# Patient Record
Sex: FEMALE | Race: WHITE | NOT HISPANIC OR LATINO
[De-identification: names, ages, dates, MRNs, and addresses within clinical notes are randomized per-mention and may not be internally consistent; named-entity substitution may affect disease eponyms.]

---

## 2020-04-26 ENCOUNTER — MESSAGE (OUTPATIENT)
Age: 25
End: 2020-04-26

## 2020-05-05 LAB
SARS-COV-2 IGG SERPL IA-ACNC: <0.1 INDEX
SARS-COV-2 IGG SERPL QL IA: NEGATIVE

## 2020-05-06 ENCOUNTER — APPOINTMENT (OUTPATIENT)
Dept: DISASTER EMERGENCY | Facility: HOSPITAL | Age: 25
End: 2020-05-06

## 2021-06-18 ENCOUNTER — EMERGENCY (EMERGENCY)
Facility: HOSPITAL | Age: 26
LOS: 1 days | Discharge: HOME | End: 2021-06-18
Attending: EMERGENCY MEDICINE
Payer: COMMERCIAL

## 2021-06-18 VITALS
WEIGHT: 132.28 LBS | SYSTOLIC BLOOD PRESSURE: 141 MMHG | TEMPERATURE: 99 F | HEART RATE: 119 BPM | HEIGHT: 60 IN | OXYGEN SATURATION: 98 % | RESPIRATION RATE: 18 BRPM | DIASTOLIC BLOOD PRESSURE: 95 MMHG

## 2021-06-18 PROCEDURE — 99284 EMERGENCY DEPT VISIT MOD MDM: CPT

## 2021-06-18 PROCEDURE — 99283 EMERGENCY DEPT VISIT LOW MDM: CPT

## 2021-06-18 RX ORDER — CYCLOBENZAPRINE HYDROCHLORIDE 10 MG/1
5 TABLET, FILM COATED ORAL ONCE
Refills: 0 | Status: COMPLETED | OUTPATIENT
Start: 2021-06-18 | End: 2021-06-18

## 2021-06-18 RX ORDER — METHOCARBAMOL 500 MG/1
1500 TABLET, FILM COATED ORAL ONCE
Refills: 0 | Status: DISCONTINUED | OUTPATIENT
Start: 2021-06-18 | End: 2021-06-18

## 2021-06-18 RX ORDER — CYCLOBENZAPRINE HYDROCHLORIDE 10 MG/1
1 TABLET, FILM COATED ORAL
Qty: 3 | Refills: 0
Start: 2021-06-18 | End: 2021-06-18

## 2021-06-18 RX ADMIN — CYCLOBENZAPRINE HYDROCHLORIDE 5 MILLIGRAM(S): 10 TABLET, FILM COATED ORAL at 22:15

## 2021-06-18 NOTE — ED PROVIDER NOTE - PHYSICAL EXAMINATION
Gen: No acute distress, non toxic  HEENT: Mucous membranes moist, pink conjunctivae, EOMI  CV: Well perfused  Resp: No resp distress  Neuro: A&O x 3, moving all 4 extremities  MSK: No spine or joint tenderness to palpation. mild right paralumbar ttp. FWB and ambulatory with steady gait  Skin: No rashes. intact and perfused.

## 2021-06-18 NOTE — ED PROVIDER NOTE - NSFOLLOWUPINSTRUCTIONS_ED_ALL_ED_FT
- Follow up with your doctor within 2-3 days.   - Return to the ED for any new or worsening symptoms included but not limited to weakness, urinary symptoms, numbness, difficulty walking, bowel/bladder incontinence, fevers, etc  - May take ibuprofen 600mg every 6 hours as needed for pain  - May take flexeril 5mg as needed for pain  - Avoid heavy lifting, significant exertion  - Apply heating pads/warm compresses to affected area while resting     Back Pain    Back pain is very common in adults. The cause of back pain is rarely dangerous and the pain often gets better over time. The cause of your back pain may not be known and may include strain of muscles or ligaments, degeneration of the spinal disks, or arthritis. Occasionally the pain may radiate down your leg(s). Over-the-counter medicines to reduce pain and inflammation are often the most helpful. Stretching and remaining active frequently helps the healing process.     SEEK IMMEDIATE MEDICAL CARE IF YOU HAVE ANY OF THE FOLLOWING SYMPTOMS: bowel or bladder control problems, unusual weakness or numbness in your arms or legs, nausea or vomiting, abdominal pain, fever, dizziness/lightheadedness.

## 2021-06-18 NOTE — ED PROVIDER NOTE - NS ED ROS FT
Gen: denies fever, chills, fatigue, weight loss  Skin: denies rashes, laceration, bruising  HEENT: denies visual changes, ear pain, nasal congestion, throat pain  Respiratory: denies TYLER, SOB, cough, wheezing  Cardiovascular: denies chest pain, palpitations, diaphoresis, LE edema  GI: denies abdominal pain, n/v/d  : denies dysuria, frequency, urgency, bowel/bladder incontinence  MSK: +BACK PAIN. denies joint swelling/pain, neck pain  Neuro: denies headache, dizziness, weakness, numbness  Psych: denies anxiety, depression, SI/HI, visual/auditory hallucinations

## 2021-06-18 NOTE — ED PROVIDER NOTE - CLINICAL SUMMARY MEDICAL DECISION MAKING FREE TEXT BOX
25yo F presenting with mild low back pain after twisting. Pt well appearing, ambulatory without difficulty. No red flags. Neuro intact. Given rx for flexeril. Stable for dc
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
## 2021-06-18 NOTE — ED PROVIDER NOTE - PATIENT PORTAL LINK FT
You can access the FollowMyHealth Patient Portal offered by [facility] by registering at the following website: [email or web address]. By joining [employer]’s FollowMyHealth portal, you will also be able to view your health information using other applications (apps) compatible with our system.

## 2022-12-13 NOTE — ED PROVIDER NOTE - ATTENDING CONTRIBUTION TO CARE
Patient with lower back pain.  Evaluated by PA.  VSS.  Non toxic.  Well appearing. NV intact.  will treat.  will f/u.  Uneventful ED observation period. 13-Dec-2022 00:32

## 2023-12-07 ENCOUNTER — EMERGENCY (EMERGENCY)
Facility: HOSPITAL | Age: 28
LOS: 1 days | Discharge: HOME | End: 2023-12-07
Attending: EMERGENCY MEDICINE
Payer: COMMERCIAL

## 2023-12-07 VITALS
OXYGEN SATURATION: 97 % | TEMPERATURE: 98 F | SYSTOLIC BLOOD PRESSURE: 149 MMHG | WEIGHT: 130.07 LBS | HEIGHT: 60 IN | RESPIRATION RATE: 20 BRPM | HEART RATE: 124 BPM | DIASTOLIC BLOOD PRESSURE: 86 MMHG

## 2023-12-07 VITALS — DIASTOLIC BLOOD PRESSURE: 82 MMHG | HEART RATE: 110 BPM | SYSTOLIC BLOOD PRESSURE: 133 MMHG

## 2023-12-07 PROCEDURE — 99284 EMERGENCY DEPT VISIT MOD MDM: CPT

## 2023-12-07 PROCEDURE — 73590 X-RAY EXAM OF LOWER LEG: CPT | Mod: 26,RT

## 2023-12-07 PROCEDURE — 73630 X-RAY EXAM OF FOOT: CPT | Mod: 26,RT

## 2023-12-07 PROCEDURE — 73590 X-RAY EXAM OF LOWER LEG: CPT

## 2023-12-07 PROCEDURE — 73630 X-RAY EXAM OF FOOT: CPT

## 2023-12-07 PROCEDURE — 73610 X-RAY EXAM OF ANKLE: CPT

## 2023-12-07 PROCEDURE — 73610 X-RAY EXAM OF ANKLE: CPT | Mod: 26,RT

## 2023-12-07 PROCEDURE — 99053 MED SERV 10PM-8AM 24 HR FAC: CPT

## 2023-12-07 NOTE — ED PROVIDER NOTE - PHYSICAL EXAMINATION
Gen: No acute distress, non toxic  HEENT: Mucous membranes moist, pink conjunctivae, EOMI  CV: RRR, nl s1/s2.  Resp: CTAB, normal rate and effort  Neuro: A&O x4, MAEx4. 5/5 str ext x 4. Sensation intact, symmetric throughout. No fnd's  MSK: +ttp to the rt lateral malleolus. no gross deformity of extremitY. FROM LE b/l. compartments soft/compressible. FWB and ambulating.   Skin: No rashes. intact and perfused. Cap refill <2sec  Vascular: Radial and dorsalis pedal pulses 2+ b/l.

## 2023-12-07 NOTE — ED PROVIDER NOTE - NSFOLLOWUPINSTRUCTIONS_ED_ALL_ED_FT
- Please follow-up with your primary care doctor in the next 1-2 days.  Please call tomorrow for an appointment.  If you cannot follow-up with your primary care doctor please return to the ED for any urgent issues.  - You were given a copy of the tests performed today.  Please bring the results with you and review them with your primary care doctor.  - If you have any worsening of symptoms or any other concerns please return to the ED immediately.  - Please continue taking your home medications as directed.   - Follow up with the orthopedist within 7-10 days.

## 2023-12-07 NOTE — ED PROVIDER NOTE - OBJECTIVE STATEMENT
29 yo female with no pmhx presents with rt ankle pain x 1wk. States 1 wk ago was walking on the street in boots when she inverted the right ankle.     Dnies coolness/changes in colors to the extremities, paresthesias in the extremities, rashes. 27 yo female with no pmhx presents with rt ankle pain x 1wk. States 1 wk ago was walking on the street in boots when she inverted the right ankle. States that she was able to walk on it afterwards but the pain has persisted. States that the pain worsens upon inverting the right ankle. Reports she is able to bear weight on the rt foot but with pain. Denies cp, sob, calf pain, coolness/changes in colors to the extremities, paresthesias in the extremities, rashes.

## 2023-12-07 NOTE — ED PROVIDER NOTE - ATTENDING APP SHARED VISIT CONTRIBUTION OF CARE
Kaiyln HERWO-72-zakg-old female with baseline tachycardia presents with right ankle pain and swelling for 1 week after she had rolled it over  while walking and has persistent pain.  Patient is able to walk but has constant pain and gets swollen after she finishes her work.  No recurrent fall or trauma patient denies pregnancy    Patient is alert well-appearing female, S1-S2 normal regular, right ankle is swollen and tender on the lateral malleolus but normal range of motion, neuro exam alert oriented x 3, skin warm dry good turgor    Patient had soft tissue swelling noted on the x-ray of the right ankle and the foot likely is a ligamentous sprain.  Will place her in the Aircast and advised rest next 2 days and follow-up with Ortho in case of persistent symptoms.  Patient was found to be tachycardic and mildly hypertensive we will recheck her vital signs.  Per patient, She runs between HR 100to 115 at baseline. Kailyn HERDS-74-dlfu-old female with baseline tachycardia presents with right ankle pain and swelling for 1 week after she had rolled it over  while walking and has persistent pain.  Patient is able to walk but has constant pain and gets swollen after she finishes her work.  No recurrent fall or trauma patient denies pregnancy    Patient is alert well-appearing female, S1-S2 normal regular, right ankle is swollen and tender on the lateral malleolus but normal range of motion, neuro exam alert oriented x 3, skin warm dry good turgor    Patient had soft tissue swelling noted on the x-ray of the right ankle and the foot likely is a ligamentous sprain.  Will place her in the Aircast and advised rest next 2 days and follow-up with Ortho in case of persistent symptoms.  Patient was found to be tachycardic and mildly hypertensive we will recheck her vital signs.  Per patient, She runs between HR 100to 115 at baseline.

## 2023-12-07 NOTE — ED PROVIDER NOTE - CARE PROVIDERS DIRECT ADDRESSES
,[email or web address]

## 2023-12-07 NOTE — ED ADULT TRIAGE NOTE - ACCOMPANIED BY
Pt called to see if prescription from 04/18/2022 could be sent to her pharmacy. States she was unable to pick it up after being seen due to family issues. Talked to provider and provider approved a verbal order to [facility] for Methylprednisolone (medrol Dose pack) 4mg table therapy pack and to take it as directed on prescription.     Self

## 2023-12-07 NOTE — ED ADULT TRIAGE NOTE - CHIEF COMPLAINT QUOTE
Patient presents to ED with c/o right ankle pain since Thursday.  Per patient, she was walking and stepped on an uneven part of sidewalk and twisted ankle.  Last Motrin yesterday.  Right lateral malleolus noted to be swollen.

## 2023-12-07 NOTE — ED PROVIDER NOTE - PATIENT PORTAL LINK FT
You can access the FollowMyHealth Patient Portal offered by [facility] by registering at the following website: [email or web address]. By joining [employer]’s FollowMyHealth portal, you will also be able to view your health information using other applications (apps) compatible with our system. You can access the FollowMyHealth Patient Portal offered by [facility] by registering at the following website: [email or web address]. By joining [employer]’s FollowMyHealth portal, you will also be able to view your health information using other applications (apps) compatible with our system.

## 2023-12-07 NOTE — ED PROVIDER NOTE - NSFOLLOWUPCLINICS_GEN_ALL_ED_FT
[id number] [place] [address]  Phone: [phone]  Fax:      [address] [place] [address]  Phone: [phone]  Fax:

## 2023-12-07 NOTE — ED PROVIDER NOTE - CARE PROVIDER_API CALL
James Delgado  Orthopaedic Surgery  [address] [place] [address]  Phone: [phone]  Fax: [phone]  Follow Up Time:    James Delgado  Orthopaedic Surgery  [address] [place] [id number]  Phone: [phone]  Fax: [phone]  Follow Up Time:

## 2023-12-07 NOTE — ED PROVIDER NOTE - CLINICAL SUMMARY MEDICAL DECISION MAKING FREE TEXT BOX
pt presents with rt ankle pain after inversion injury. xrays performed- soft tissue swelling visualized over the lateral malleolus. likely sprain. aircast palced. ortho f/u explained. strict return precautions explained.

## 2023-12-08 PROBLEM — Z78.9 OTHER SPECIFIED HEALTH STATUS: Chronic | Status: ACTIVE | Noted: 2021-06-18

## 2024-04-23 ENCOUNTER — APPOINTMENT (OUTPATIENT)
Dept: PEDIATRIC CARDIOLOGY | Facility: CLINIC | Age: 29
End: 2024-04-23
Payer: COMMERCIAL

## 2024-04-23 PROBLEM — Z00.00 ENCOUNTER FOR PREVENTIVE HEALTH EXAMINATION: Status: ACTIVE | Noted: 2024-04-23

## 2024-04-23 PROCEDURE — 93325 DOPPLER ECHO COLOR FLOW MAPG: CPT

## 2024-04-23 PROCEDURE — 76827 ECHO EXAM OF FETAL HEART: CPT

## 2024-04-23 PROCEDURE — 99202 OFFICE O/P NEW SF 15 MIN: CPT | Mod: 25

## 2024-04-23 PROCEDURE — 76825 ECHO EXAM OF FETAL HEART: CPT

## 2024-06-22 ENCOUNTER — NON-APPOINTMENT (OUTPATIENT)
Age: 29
End: 2024-06-22

## 2024-07-30 ENCOUNTER — INPATIENT (INPATIENT)
Facility: HOSPITAL | Age: 29
LOS: 5 days | Discharge: HOME | DRG: 832 | End: 2024-08-05
Attending: STUDENT IN AN ORGANIZED HEALTH CARE EDUCATION/TRAINING PROGRAM | Admitting: STUDENT IN AN ORGANIZED HEALTH CARE EDUCATION/TRAINING PROGRAM
Payer: COMMERCIAL

## 2024-07-30 VITALS — OXYGEN SATURATION: 100 % | HEART RATE: 107 BPM

## 2024-07-30 DIAGNOSIS — O14.03 MILD TO MODERATE PRE-ECLAMPSIA, THIRD TRIMESTER: ICD-10-CM

## 2024-07-30 DIAGNOSIS — O24.410 GESTATIONAL DIABETES MELLITUS IN PREGNANCY, DIET CONTROLLED: ICD-10-CM

## 2024-07-30 RX ORDER — OXYTOCIN/RINGER'S LACTATE 20/1000 ML
333.33 PLASTIC BAG, INJECTION (ML) INTRAVENOUS
Qty: 20 | Refills: 0 | Status: DISCONTINUED | OUTPATIENT
Start: 2024-07-30 | End: 2024-07-31

## 2024-07-30 RX ORDER — CHLORHEXIDINE GLUCONATE 500 MG/1
1 CLOTH TOPICAL DAILY
Refills: 0 | Status: DISCONTINUED | OUTPATIENT
Start: 2024-07-30 | End: 2024-07-31

## 2024-07-30 RX ORDER — DEXTROSE MONOHYDRATE, SODIUM CHLORIDE, SODIUM LACTATE, CALCIUM CHLORIDE, MAGNESIUM CHLORIDE 1.5; 538; 448; 18.4; 5.08 G/100ML; MG/100ML; MG/100ML; MG/100ML; MG/100ML
1000 SOLUTION INTRAPERITONEAL
Refills: 0 | Status: DISCONTINUED | OUTPATIENT
Start: 2024-07-30 | End: 2024-07-31

## 2024-07-30 RX ORDER — BACTERIOSTATIC SODIUM CHLORIDE 0.9 %
1000 VIAL (ML) INJECTION
Refills: 0 | Status: DISCONTINUED | OUTPATIENT
Start: 2024-07-30 | End: 2024-07-31

## 2024-07-30 RX ORDER — TRISODIUM CITRATE DIHYDRATE AND CITRIC ACID MONOHYDRATE 500; 334 MG/5ML; MG/5ML
15 SOLUTION ORAL EVERY 6 HOURS
Refills: 0 | Status: DISCONTINUED | OUTPATIENT
Start: 2024-07-30 | End: 2024-07-31

## 2024-07-30 NOTE — PRE-ANESTHESIA EVALUATION ADULT - NSANTHOSAYNRD_GEN_A_CORE
No. CLEMENT screening performed.  STOP BANG Legend: 0-2 = LOW Risk; 3-4 = INTERMEDIATE Risk; 5-8 = HIGH Risk

## 2024-07-30 NOTE — PRE-ANESTHESIA EVALUATION ADULT - NSANTHPMHFT_GEN_ALL_CORE
37.1 weeeks gestation; pre-eclampsia 7/23 with LE edema/vision changes/SOB/elevated BP;  gestational diabetes; h/o ovarian cysts; OCD on prozac;

## 2024-07-31 LAB
ALBUMIN SERPL ELPH-MCNC: 3.3 G/DL — SIGNIFICANT CHANGE UP (ref 3.3–5)
ALBUMIN SERPL ELPH-MCNC: 3.4 G/DL — SIGNIFICANT CHANGE UP (ref 3.3–5)
ALP SERPL-CCNC: 252 U/L — HIGH (ref 40–120)
ALP SERPL-CCNC: 285 U/L — HIGH (ref 40–120)
ALT FLD-CCNC: 7 U/L — LOW (ref 10–45)
ALT FLD-CCNC: 7 U/L — LOW (ref 10–45)
ANION GAP SERPL CALC-SCNC: 11 MMOL/L — SIGNIFICANT CHANGE UP (ref 5–17)
ANION GAP SERPL CALC-SCNC: 16 MMOL/L — SIGNIFICANT CHANGE UP (ref 5–17)
APPEARANCE UR: CLEAR — SIGNIFICANT CHANGE UP
AST SERPL-CCNC: 12 U/L — SIGNIFICANT CHANGE UP (ref 10–40)
AST SERPL-CCNC: 14 U/L — SIGNIFICANT CHANGE UP (ref 10–40)
BACTERIA # UR AUTO: ABNORMAL /HPF
BASOPHILS # BLD AUTO: 0.02 K/UL — SIGNIFICANT CHANGE UP (ref 0–0.2)
BASOPHILS # BLD AUTO: 0.04 K/UL — SIGNIFICANT CHANGE UP (ref 0–0.2)
BASOPHILS NFR BLD AUTO: 0.2 % — SIGNIFICANT CHANGE UP (ref 0–2)
BASOPHILS NFR BLD AUTO: 0.3 % — SIGNIFICANT CHANGE UP (ref 0–2)
BILIRUB SERPL-MCNC: 0.1 MG/DL — LOW (ref 0.2–1.2)
BILIRUB SERPL-MCNC: 0.1 MG/DL — LOW (ref 0.2–1.2)
BILIRUB UR-MCNC: NEGATIVE — SIGNIFICANT CHANGE UP
BUN SERPL-MCNC: 6 MG/DL — LOW (ref 7–23)
BUN SERPL-MCNC: 8 MG/DL — SIGNIFICANT CHANGE UP (ref 7–23)
CALCIUM SERPL-MCNC: 8.5 MG/DL — SIGNIFICANT CHANGE UP (ref 8.4–10.5)
CALCIUM SERPL-MCNC: 8.7 MG/DL — SIGNIFICANT CHANGE UP (ref 8.4–10.5)
CAST: 2 /LPF — SIGNIFICANT CHANGE UP (ref 0–4)
CHLORIDE SERPL-SCNC: 102 MMOL/L — SIGNIFICANT CHANGE UP (ref 96–108)
CHLORIDE SERPL-SCNC: 103 MMOL/L — SIGNIFICANT CHANGE UP (ref 96–108)
CO2 SERPL-SCNC: 18 MMOL/L — LOW (ref 22–31)
CO2 SERPL-SCNC: 20 MMOL/L — LOW (ref 22–31)
COLOR SPEC: YELLOW — SIGNIFICANT CHANGE UP
CREAT ?TM UR-MCNC: 59 MG/DL — SIGNIFICANT CHANGE UP
CREAT SERPL-MCNC: 0.59 MG/DL — SIGNIFICANT CHANGE UP (ref 0.5–1.3)
CREAT SERPL-MCNC: 0.61 MG/DL — SIGNIFICANT CHANGE UP (ref 0.5–1.3)
DIFF PNL FLD: NEGATIVE — SIGNIFICANT CHANGE UP
EGFR: 122 ML/MIN/1.73M2 — SIGNIFICANT CHANGE UP
EGFR: 123 ML/MIN/1.73M2 — SIGNIFICANT CHANGE UP
EOSINOPHIL # BLD AUTO: 0.03 K/UL — SIGNIFICANT CHANGE UP (ref 0–0.5)
EOSINOPHIL # BLD AUTO: 0.07 K/UL — SIGNIFICANT CHANGE UP (ref 0–0.5)
EOSINOPHIL NFR BLD AUTO: 0.2 % — SIGNIFICANT CHANGE UP (ref 0–6)
EOSINOPHIL NFR BLD AUTO: 0.7 % — SIGNIFICANT CHANGE UP (ref 0–6)
GLUCOSE BLDC GLUCOMTR-MCNC: 110 MG/DL — HIGH (ref 70–99)
GLUCOSE BLDC GLUCOMTR-MCNC: 76 MG/DL — SIGNIFICANT CHANGE UP (ref 70–99)
GLUCOSE BLDC GLUCOMTR-MCNC: 85 MG/DL — SIGNIFICANT CHANGE UP (ref 70–99)
GLUCOSE BLDC GLUCOMTR-MCNC: 86 MG/DL — SIGNIFICANT CHANGE UP (ref 70–99)
GLUCOSE BLDC GLUCOMTR-MCNC: 98 MG/DL — SIGNIFICANT CHANGE UP (ref 70–99)
GLUCOSE BLDC GLUCOMTR-MCNC: 98 MG/DL — SIGNIFICANT CHANGE UP (ref 70–99)
GLUCOSE SERPL-MCNC: 70 MG/DL — SIGNIFICANT CHANGE UP (ref 70–99)
GLUCOSE SERPL-MCNC: 86 MG/DL — SIGNIFICANT CHANGE UP (ref 70–99)
GLUCOSE UR QL: NEGATIVE MG/DL — SIGNIFICANT CHANGE UP
HCT VFR BLD CALC: 26.4 % — LOW (ref 34.5–45)
HCT VFR BLD CALC: 30.8 % — LOW (ref 34.5–45)
HGB BLD-MCNC: 8.5 G/DL — LOW (ref 11.5–15.5)
HGB BLD-MCNC: 9.5 G/DL — LOW (ref 11.5–15.5)
IMM GRANULOCYTES NFR BLD AUTO: 0.6 % — SIGNIFICANT CHANGE UP (ref 0–0.9)
IMM GRANULOCYTES NFR BLD AUTO: 0.7 % — SIGNIFICANT CHANGE UP (ref 0–0.9)
KETONES UR-MCNC: NEGATIVE MG/DL — SIGNIFICANT CHANGE UP
LDH SERPL L TO P-CCNC: 193 U/L — SIGNIFICANT CHANGE UP (ref 50–242)
LDH SERPL L TO P-CCNC: 262 U/L — HIGH (ref 50–242)
LEUKOCYTE ESTERASE UR-ACNC: ABNORMAL
LYMPHOCYTES # BLD AUTO: 2.42 K/UL — SIGNIFICANT CHANGE UP (ref 1–3.3)
LYMPHOCYTES # BLD AUTO: 2.6 K/UL — SIGNIFICANT CHANGE UP (ref 1–3.3)
LYMPHOCYTES # BLD AUTO: 20.1 % — SIGNIFICANT CHANGE UP (ref 13–44)
LYMPHOCYTES # BLD AUTO: 26.4 % — SIGNIFICANT CHANGE UP (ref 13–44)
MAGNESIUM SERPL-MCNC: 5.4 MG/DL — HIGH (ref 1.6–2.6)
MAGNESIUM SERPL-MCNC: 6.6 MG/DL — HIGH (ref 1.6–2.6)
MAGNESIUM SERPL-MCNC: 7.4 MG/DL — HIGH (ref 1.6–2.6)
MCHC RBC-ENTMCNC: 24.7 PG — LOW (ref 27–34)
MCHC RBC-ENTMCNC: 25.4 PG — LOW (ref 27–34)
MCHC RBC-ENTMCNC: 30.8 GM/DL — LOW (ref 32–36)
MCHC RBC-ENTMCNC: 32.2 GM/DL — SIGNIFICANT CHANGE UP (ref 32–36)
MCV RBC AUTO: 79 FL — LOW (ref 80–100)
MCV RBC AUTO: 80 FL — SIGNIFICANT CHANGE UP (ref 80–100)
MONOCYTES # BLD AUTO: 0.74 K/UL — SIGNIFICANT CHANGE UP (ref 0–0.9)
MONOCYTES # BLD AUTO: 0.87 K/UL — SIGNIFICANT CHANGE UP (ref 0–0.9)
MONOCYTES NFR BLD AUTO: 6.2 % — SIGNIFICANT CHANGE UP (ref 2–14)
MONOCYTES NFR BLD AUTO: 8.8 % — SIGNIFICANT CHANGE UP (ref 2–14)
NEUTROPHILS # BLD AUTO: 6.23 K/UL — SIGNIFICANT CHANGE UP (ref 1.8–7.4)
NEUTROPHILS # BLD AUTO: 8.71 K/UL — HIGH (ref 1.8–7.4)
NEUTROPHILS NFR BLD AUTO: 63.3 % — SIGNIFICANT CHANGE UP (ref 43–77)
NEUTROPHILS NFR BLD AUTO: 72.5 % — SIGNIFICANT CHANGE UP (ref 43–77)
NITRITE UR-MCNC: NEGATIVE — SIGNIFICANT CHANGE UP
NRBC # BLD: 0 /100 WBCS — SIGNIFICANT CHANGE UP (ref 0–0)
NRBC # BLD: 0 /100 WBCS — SIGNIFICANT CHANGE UP (ref 0–0)
PH UR: 6.5 — SIGNIFICANT CHANGE UP (ref 5–8)
PLATELET # BLD AUTO: 309 K/UL — SIGNIFICANT CHANGE UP (ref 150–400)
PLATELET # BLD AUTO: 336 K/UL — SIGNIFICANT CHANGE UP (ref 150–400)
POTASSIUM SERPL-MCNC: 3.8 MMOL/L — SIGNIFICANT CHANGE UP (ref 3.5–5.3)
POTASSIUM SERPL-MCNC: 4.1 MMOL/L — SIGNIFICANT CHANGE UP (ref 3.5–5.3)
POTASSIUM SERPL-SCNC: 3.8 MMOL/L — SIGNIFICANT CHANGE UP (ref 3.5–5.3)
POTASSIUM SERPL-SCNC: 4.1 MMOL/L — SIGNIFICANT CHANGE UP (ref 3.5–5.3)
PROT ?TM UR-MCNC: 146 MG/DL — HIGH (ref 0–12)
PROT SERPL-MCNC: 6 G/DL — SIGNIFICANT CHANGE UP (ref 6–8.3)
PROT SERPL-MCNC: 6.6 G/DL — SIGNIFICANT CHANGE UP (ref 6–8.3)
PROT UR-MCNC: 100 MG/DL
PROT/CREAT UR-RTO: 2.5 RATIO — HIGH (ref 0–0.2)
RBC # BLD: 3.34 M/UL — LOW (ref 3.8–5.2)
RBC # BLD: 3.85 M/UL — SIGNIFICANT CHANGE UP (ref 3.8–5.2)
RBC # FLD: 14.7 % — HIGH (ref 10.3–14.5)
RBC # FLD: 15.1 % — HIGH (ref 10.3–14.5)
RBC CASTS # UR COMP ASSIST: 1 /HPF — SIGNIFICANT CHANGE UP (ref 0–4)
RH IG SCN BLD-IMP: POSITIVE — SIGNIFICANT CHANGE UP
SODIUM SERPL-SCNC: 134 MMOL/L — LOW (ref 135–145)
SODIUM SERPL-SCNC: 136 MMOL/L — SIGNIFICANT CHANGE UP (ref 135–145)
SP GR SPEC: 1.01 — SIGNIFICANT CHANGE UP (ref 1–1.03)
SQUAMOUS # UR AUTO: 4 /HPF — SIGNIFICANT CHANGE UP (ref 0–5)
URATE SERPL-MCNC: 5.7 MG/DL — SIGNIFICANT CHANGE UP (ref 2.5–7)
URATE SERPL-MCNC: 5.8 MG/DL — SIGNIFICANT CHANGE UP (ref 2.5–7)
UROBILINOGEN FLD QL: 0.2 MG/DL — SIGNIFICANT CHANGE UP (ref 0.2–1)
WBC # BLD: 12.02 K/UL — HIGH (ref 3.8–10.5)
WBC # BLD: 9.85 K/UL — SIGNIFICANT CHANGE UP (ref 3.8–10.5)
WBC # FLD AUTO: 12.02 K/UL — HIGH (ref 3.8–10.5)
WBC # FLD AUTO: 9.85 K/UL — SIGNIFICANT CHANGE UP (ref 3.8–10.5)
WBC UR QL: 25 /HPF — HIGH (ref 0–5)

## 2024-07-31 PROCEDURE — 88307 TISSUE EXAM BY PATHOLOGIST: CPT | Mod: 26

## 2024-07-31 RX ORDER — DIBUCAINE 1 %
1 OINTMENT (GRAM) TOPICAL EVERY 6 HOURS
Refills: 0 | Status: DISCONTINUED | OUTPATIENT
Start: 2024-07-31 | End: 2024-08-05

## 2024-07-31 RX ORDER — LABETALOL HCL 200 MG
20 TABLET ORAL ONCE
Refills: 0 | Status: COMPLETED | OUTPATIENT
Start: 2024-07-31 | End: 2024-07-31

## 2024-07-31 RX ORDER — NIFEDIPINE 20 MG/1
30 CAPSULE, LIQUID FILLED ORAL DAILY
Refills: 0 | Status: DISCONTINUED | OUTPATIENT
Start: 2024-07-31 | End: 2024-08-03

## 2024-07-31 RX ORDER — CLOSTRIDIUM TETANI TOXOID ANTIGEN (FORMALDEHYDE INACTIVATED), CORYNEBACTERIUM DIPHTHERIAE TOXOID ANTIGEN (FORMALDEHYDE INACTIVATED), BORDETELLA PERTUSSIS TOXOID ANTIGEN (GLUTARALDEHYDE INACTIVATED), BORDETELLA PERTUSSIS FILAMENTOUS HEMAGGLUTININ ANTIGEN (FORMALDEHYDE INACTIVATED), BORDETELLA PERTUSSIS PERTACTIN ANTIGEN, AND BORDETELLA PERTUSSIS FIMBRIAE 2/3 ANTIGEN 5; 2; 2.5; 5; 3; 5 [LF]/.5ML; [LF]/.5ML; UG/.5ML; UG/.5ML; UG/.5ML; UG/.5ML
0.5 INJECTION, SUSPENSION INTRAMUSCULAR ONCE
Refills: 0 | Status: DISCONTINUED | OUTPATIENT
Start: 2024-07-31 | End: 2024-08-05

## 2024-07-31 RX ORDER — OXYCODONE HYDROCHLORIDE 30 MG/1
5 TABLET ORAL
Refills: 0 | Status: DISCONTINUED | OUTPATIENT
Start: 2024-07-31 | End: 2024-08-05

## 2024-07-31 RX ORDER — SIMETHICONE 125 MG/1
80 TABLET, CHEWABLE ORAL EVERY 4 HOURS
Refills: 0 | Status: DISCONTINUED | OUTPATIENT
Start: 2024-07-31 | End: 2024-08-05

## 2024-07-31 RX ORDER — AMPICILLIN 1 G/1
2 INJECTION, POWDER, FOR SOLUTION INTRAMUSCULAR; INTRAVENOUS ONCE
Refills: 0 | Status: COMPLETED | OUTPATIENT
Start: 2024-07-31 | End: 2024-07-31

## 2024-07-31 RX ORDER — FLUOXETINE HCL 10 MG
40 CAPSULE ORAL DAILY
Refills: 0 | Status: DISCONTINUED | OUTPATIENT
Start: 2024-07-31 | End: 2024-08-05

## 2024-07-31 RX ORDER — HYDROCORTISONE 1 %
1 CREAM (GRAM) TOPICAL EVERY 6 HOURS
Refills: 0 | Status: DISCONTINUED | OUTPATIENT
Start: 2024-07-31 | End: 2024-08-05

## 2024-07-31 RX ORDER — CRANBERRY FRUIT EXTRACT 650 MG
1 CAPSULE ORAL DAILY
Refills: 0 | Status: DISCONTINUED | OUTPATIENT
Start: 2024-07-31 | End: 2024-08-05

## 2024-07-31 RX ORDER — ONDANSETRON HCL/PF 4 MG/2 ML
4 VIAL (ML) INJECTION ONCE
Refills: 0 | Status: COMPLETED | OUTPATIENT
Start: 2024-07-31 | End: 2024-07-31

## 2024-07-31 RX ORDER — BACTERIOSTATIC SODIUM CHLORIDE 0.9 %
500 VIAL (ML) INJECTION ONCE
Refills: 0 | Status: COMPLETED | OUTPATIENT
Start: 2024-07-31 | End: 2024-07-31

## 2024-07-31 RX ORDER — WITCH HAZEL 500 MG/1
1 CLOTH TOPICAL EVERY 4 HOURS
Refills: 0 | Status: DISCONTINUED | OUTPATIENT
Start: 2024-07-31 | End: 2024-08-05

## 2024-07-31 RX ORDER — MAGNESIUM HYDROXIDE 400 MG/5ML
30 SUSPENSION, ORAL (FINAL DOSE FORM) ORAL
Refills: 0 | Status: DISCONTINUED | OUTPATIENT
Start: 2024-07-31 | End: 2024-08-05

## 2024-07-31 RX ORDER — CARBOPROST TROMETHAMINE 250 UG/ML
250 INJECTION, SOLUTION INTRAMUSCULAR ONCE
Refills: 0 | Status: COMPLETED | OUTPATIENT
Start: 2024-07-31 | End: 2024-07-31

## 2024-07-31 RX ORDER — LANOLIN 100 %
1 OINTMENT (GRAM) TOPICAL EVERY 6 HOURS
Refills: 0 | Status: DISCONTINUED | OUTPATIENT
Start: 2024-07-31 | End: 2024-08-05

## 2024-07-31 RX ORDER — OXYTOCIN/RINGER'S LACTATE 20/1000 ML
4 PLASTIC BAG, INJECTION (ML) INTRAVENOUS
Qty: 30 | Refills: 0 | Status: DISCONTINUED | OUTPATIENT
Start: 2024-07-31 | End: 2024-07-31

## 2024-07-31 RX ORDER — ACETAMINOPHEN 500 MG
975 TABLET ORAL ONCE
Refills: 0 | Status: DISCONTINUED | OUTPATIENT
Start: 2024-07-31 | End: 2024-07-31

## 2024-07-31 RX ORDER — BACTERIOSTATIC SODIUM CHLORIDE 0.9 %
1000 VIAL (ML) INJECTION
Refills: 0 | Status: DISCONTINUED | OUTPATIENT
Start: 2024-07-31 | End: 2024-07-31

## 2024-07-31 RX ORDER — OXYTOCIN/RINGER'S LACTATE 20/1000 ML
10 PLASTIC BAG, INJECTION (ML) INTRAVENOUS ONCE
Refills: 0 | Status: COMPLETED | OUTPATIENT
Start: 2024-07-31 | End: 2024-07-31

## 2024-07-31 RX ORDER — IBUPROFEN 200 MG
600 TABLET ORAL EVERY 6 HOURS
Refills: 0 | Status: COMPLETED | OUTPATIENT
Start: 2024-07-31 | End: 2025-06-29

## 2024-07-31 RX ORDER — AMPICILLIN 1 G/1
1 INJECTION, POWDER, FOR SOLUTION INTRAMUSCULAR; INTRAVENOUS EVERY 4 HOURS
Refills: 0 | Status: DISCONTINUED | OUTPATIENT
Start: 2024-07-31 | End: 2024-07-31

## 2024-07-31 RX ORDER — OXYCODONE HYDROCHLORIDE 30 MG/1
5 TABLET ORAL ONCE
Refills: 0 | Status: DISCONTINUED | OUTPATIENT
Start: 2024-07-31 | End: 2024-08-05

## 2024-07-31 RX ORDER — BACTERIOSTATIC SODIUM CHLORIDE 0.9 %
500 VIAL (ML) INJECTION ONCE
Refills: 0 | Status: DISCONTINUED | OUTPATIENT
Start: 2024-07-31 | End: 2024-07-31

## 2024-07-31 RX ORDER — MAGNESIUM SULFATE 500 MG/ML
4 VIAL (ML) INJECTION ONCE
Refills: 0 | Status: COMPLETED | OUTPATIENT
Start: 2024-07-31 | End: 2024-07-31

## 2024-07-31 RX ORDER — DIPHENHYDRAMINE HCL 25 MG
25 CAPSULE ORAL EVERY 6 HOURS
Refills: 0 | Status: DISCONTINUED | OUTPATIENT
Start: 2024-07-31 | End: 2024-08-05

## 2024-07-31 RX ORDER — OXYTOCIN/RINGER'S LACTATE 20/1000 ML
41.67 PLASTIC BAG, INJECTION (ML) INTRAVENOUS
Qty: 20 | Refills: 0 | Status: DISCONTINUED | OUTPATIENT
Start: 2024-07-31 | End: 2024-08-05

## 2024-07-31 RX ORDER — ACETAMINOPHEN 500 MG
1000 TABLET ORAL ONCE
Refills: 0 | Status: COMPLETED | OUTPATIENT
Start: 2024-07-31 | End: 2024-07-31

## 2024-07-31 RX ORDER — MAGNESIUM SULFATE 500 MG/ML
2 VIAL (ML) INJECTION
Qty: 40 | Refills: 0 | Status: DISCONTINUED | OUTPATIENT
Start: 2024-07-31 | End: 2024-08-01

## 2024-07-31 RX ORDER — ACETAMINOPHEN 500 MG
975 TABLET ORAL
Refills: 0 | Status: DISCONTINUED | OUTPATIENT
Start: 2024-07-31 | End: 2024-08-05

## 2024-07-31 RX ORDER — DIPHENOXYLATE HCL/ATROPINE 2.5-.025MG
2 TABLET ORAL ONCE
Refills: 0 | Status: DISCONTINUED | OUTPATIENT
Start: 2024-07-31 | End: 2024-07-31

## 2024-07-31 RX ORDER — DEXTROSE MONOHYDRATE, SODIUM CHLORIDE, SODIUM LACTATE, CALCIUM CHLORIDE, MAGNESIUM CHLORIDE 1.5; 538; 448; 18.4; 5.08 G/100ML; MG/100ML; MG/100ML; MG/100ML; MG/100ML
1000 SOLUTION INTRAPERITONEAL
Refills: 0 | Status: DISCONTINUED | OUTPATIENT
Start: 2024-07-31 | End: 2024-07-31

## 2024-07-31 RX ORDER — KETOROLAC TROMETHAMINE 10 MG
30 TABLET ORAL ONCE
Refills: 0 | Status: DISCONTINUED | OUTPATIENT
Start: 2024-07-31 | End: 2024-08-05

## 2024-07-31 RX ORDER — BACTERIOSTATIC SODIUM CHLORIDE 0.9 %
3 VIAL (ML) INJECTION EVERY 8 HOURS
Refills: 0 | Status: DISCONTINUED | OUTPATIENT
Start: 2024-07-31 | End: 2024-08-05

## 2024-07-31 RX ADMIN — Medication 2 TABLET(S): at 20:04

## 2024-07-31 RX ADMIN — Medication 4 MILLIGRAM(S): at 03:08

## 2024-07-31 RX ADMIN — Medication 4 MILLIGRAM(S): at 09:28

## 2024-07-31 RX ADMIN — Medication 300 GRAM(S): at 02:47

## 2024-07-31 RX ADMIN — CARBOPROST TROMETHAMINE 250 MICROGRAM(S): 250 INJECTION, SOLUTION INTRAMUSCULAR at 20:01

## 2024-07-31 RX ADMIN — AMPICILLIN 108 GRAM(S): 1 INJECTION, POWDER, FOR SOLUTION INTRAMUSCULAR; INTRAVENOUS at 08:47

## 2024-07-31 RX ADMIN — Medication 10 UNIT(S): at 19:59

## 2024-07-31 RX ADMIN — Medication 4 MILLIGRAM(S): at 16:03

## 2024-07-31 RX ADMIN — Medication 400 MILLIGRAM(S): at 09:28

## 2024-07-31 RX ADMIN — NIFEDIPINE 30 MILLIGRAM(S): 20 CAPSULE, LIQUID FILLED ORAL at 05:39

## 2024-07-31 RX ADMIN — AMPICILLIN 200 GRAM(S): 1 INJECTION, POWDER, FOR SOLUTION INTRAMUSCULAR; INTRAVENOUS at 00:45

## 2024-07-31 RX ADMIN — Medication 4 MILLIGRAM(S): at 20:09

## 2024-07-31 RX ADMIN — Medication 4 MILLIUNIT(S)/MIN: at 15:28

## 2024-07-31 RX ADMIN — Medication 50 GM/HR: at 03:12

## 2024-07-31 RX ADMIN — DEXTROSE MONOHYDRATE, SODIUM CHLORIDE, SODIUM LACTATE, CALCIUM CHLORIDE, MAGNESIUM CHLORIDE 125 MILLILITER(S): 1.5; 538; 448; 18.4; 5.08 SOLUTION INTRAPERITONEAL at 00:45

## 2024-07-31 RX ADMIN — AMPICILLIN 108 GRAM(S): 1 INJECTION, POWDER, FOR SOLUTION INTRAMUSCULAR; INTRAVENOUS at 04:52

## 2024-07-31 RX ADMIN — Medication 4 MILLIGRAM(S): at 10:54

## 2024-07-31 RX ADMIN — Medication 20 MILLIGRAM(S): at 02:42

## 2024-07-31 RX ADMIN — AMPICILLIN 108 GRAM(S): 1 INJECTION, POWDER, FOR SOLUTION INTRAMUSCULAR; INTRAVENOUS at 17:21

## 2024-07-31 RX ADMIN — AMPICILLIN 108 GRAM(S): 1 INJECTION, POWDER, FOR SOLUTION INTRAMUSCULAR; INTRAVENOUS at 12:49

## 2024-07-31 RX ADMIN — Medication 4 MILLIUNIT(S)/MIN: at 11:09

## 2024-07-31 RX ADMIN — Medication 125 MILLILITER(S): at 00:45

## 2024-07-31 RX ADMIN — Medication 1000 MILLILITER(S): at 10:07

## 2024-07-31 NOTE — OB PROVIDER LABOR PROGRESS NOTE - ASSESSMENT
29y  at 37w1d who presents to L&D for IOL for PEC.     -Fetus category 1  -GBS pos on AMP  -Pt now s/p first PO cytotec  -CB in place now
30yo P0 @ 37w 2d admitted for IOL secondary to sPEC  - cervical balloon removed  - will start pitocin augmentation  - continue magnesium prophylaxis fo sPEC  - Anesthesia called for epidural  - zofran and IV tylenol ordered  Niesha Banks PA-C

## 2024-07-31 NOTE — OB RN DELIVERY SUMMARY - NSSELHIDDEN_OBGYN_ALL_OB_FT
[NS_DeliveryAttending1_OBGYN_ALL_OB_FT:[email or web address]],[NS_DeliveryAttending2_OBGYN_ALL_OB_FT:[email or web address]],[NS_DeliveryRN_OBGYN_ALL_OB_FT:[email or web address]=]

## 2024-07-31 NOTE — OB PROVIDER DELIVERY SUMMARY - NSPROVIDERDELIVERYNOTE_OBGYN_ALL_OB_FT
of liveborn female infant in FELIX position  Median episiotomy performed to facilitate delivery  Noted cord around left ankle and right hip/leg  Placenta delivered spontaneously and intact    Hemabate x 1, cytotec WA  Median episiotomy repaired with 2.0 and 3.0 vicryl rapide suture  Excellent hemostasis noted  Count correct x 2    MD Amisha

## 2024-07-31 NOTE — OB PROVIDER H&P - NSHPPHYSICALEXAM_GEN_ALL_CORE
T(C): 36.9 (07-31-24 @ 00:53), Max: 36.9 (07-31-24 @ 00:53)  HR: 86 (07-31-24 @ 01:30) (85 - 114)  BP: 131/86 (07-31-24 @ 01:29) (129/87 - 165/102)  RR: 18 (07-31-24 @ 00:53) (18 - 18)  SpO2: 99% (07-31-24 @ 01:30) (97% - 100%)    Gen: NAD, well-appearing, AAOx3   Abd: Soft, gravid  Ext: non-tender, non-edematous      SVE:  1/60/-3  Bedside sono: vertex  FHT: [id number], mod variability, +accels,-decels  [place]: q8-10min

## 2024-07-31 NOTE — OB PROVIDER H&P - NSGBSSTATUS_OBGYN_ALL_OB
dictation. EKG: All EKG's are interpreted by the Emergency Department Physician in the absence of a cardiologist.  Please see their note for interpretation of EKG. RADIOLOGY:   Non-plain film images such as CT, Ultrasound and MRI are read by the radiologist. Plain radiographic images are visualized andpreliminarily interpreted by the  ED Provider with the below findings:        Interpretation perthe Radiologist below, if available at the time of this note:    No orders to display     No results found. PROCEDURES   Unless otherwise noted below, none     Procedures    CRITICAL CARE TIME   N/A    CONSULTS:  None      EMERGENCY DEPARTMENT COURSE and DIFFERENTIAL DIAGNOSIS/MDM:   Vitals:    Vitals:    08/24/19 1946 08/24/19 1947   BP:  123/71   Pulse: 87    Resp: 18    Temp: 98.1 °F (36.7 °C)    SpO2: 97%    Weight: 196 lb (88.9 kg)    Height: 5' 2\" (1.575 m)        Patient was given thefollowing medications:  Medications - No data to display    Patient presents for evaluation of possible anxiety attacks. On exam, she is resting comfortably in bed in no acute distress and nontoxic. Vitals are stable and she is afebrile. Lungs are clear to auscultation bilaterally, chest is nontender and abdomen is benign. She is alert and oriented x3. GCS 15. She denies any thoughts of self-harm, suicidal or homicidal ideation. Patient was given primary health solution, PCP and mental health contact information as well as prescription for Vistaril to take as needed for anxiety until she is able to follow-up either with her care provider or mental health professional regarding increased anxiety and possible panic attacks. The patient has been evaluated and the history and physical exam suggest a benign etiology. I see nothing to suggest acute coronary syndrome, myocardial infarction, pulmonary embolism, thoracic aortic dissection, significant pericarditis, pneumonia, pneumothorax, or acute abdomen.   I feel the
Positive

## 2024-07-31 NOTE — OB PROVIDER H&P - NSLOWPPHRISK_OBGYN_A_OB
No previous uterine incision/[place] Pregnancy/Less than or equal to 4 previous vaginal births/No known bleeding disorder/No history of postpartum hemorrhage

## 2024-07-31 NOTE — OB RN PATIENT PROFILE - NSSDOHPLACELIVE_OBGYN_A_OB
Left voice message and requested refill(s): PATIENT  Medications:XANAX  Amount: 90 day supply  Pharmacy to be sent to: [facility]  Call back number: [phone]    
LOV 12/13/19. Last filled on 9/10/19.  PDMP reviewed; no aberrant behavior identified, prescription authorized.  
none of the above

## 2024-07-31 NOTE — OB PROVIDER DELIVERY SUMMARY - NSVAGDELIVERYA_OBGYN_ALL_OB
Subjective      Patient ID: Aurora Cedillo is a 49 y.o. female.    HPI    Patient presents for a physical.  UTD with preventive screenings. Colonoscopy was a few years ago, before the pandemic with Dr. Moeller, at [place].  Was also being treated for external hemorrhoids.  UTD with mammogram on 11/16/21.  Sees Dr. Rand, GYN in [place].  Last visit on 11/17/21.  Saw Dr. Heath and Dr. Mayen, ENT for eustachian tube dysfunction.  Gets dizzy when she get up from sitting.  Takes Singulair, Claritin, Asteline and Flonase.  States on Sudafed really helps her feel better.  Tries to drink about 64 oz a day.  Did not see the urologist since last visit for the R. Ureterocele.  R.pelvic pain resolved.  Has been taking metamucil daily. BMs are normal.  Toes get very cold and numb when out in the cold.  Happens when she goes sledding or snowboarding.  No pins and needles.  At night feet are cold. No skin color changes. Has a lot of stress, mostly related to her children and her parents with health issues.  Has not been able to do much yoga nowadays. Currently taking Tamiflu once a day for flu prophylaxis. Her sons were diagnosed with a terrible case of flu A. They received the flu shot this season. States she hit a plateau with weight.  No other new/acute complaints.     The following have been reviewed and updated as appropriate in this visit:     Allergies  Meds  Problems         Past Medical History:   Diagnosis Date   • Allergic rhinitis    • BMI 40.0-44.9, adult (CMS/HCC)    • Depression 6/28/2012   • Dysfunction of both eustachian tubes    • Essential hypertension, benign 6/28/2012   • Hyperlipidemia    • Right bundle branch block 6/28/2012   • Sensorineural hearing loss, bilateral    • Shingles     on head, age 20's   • Varicose veins of lower extremity    • Vitamin D deficiency      Past Surgical History:   Procedure Laterality Date   • PARTIAL KNEE ARTHROPLASTY Left      Family History    Problem Relation Age of Onset   • Atrial fibrillation Biological Mother    • Stroke Biological Father         multiple. memory issues, paranoia etc.    • Stroke Biological Brother          of CVA age 52   • ADD / ADHD Biological Daughter    • Anxiety disorder Biological Daughter    • No Known Problems Biological Son    • Autism spectrum disorder Biological Son         high functioning   • ADD / ADHD Biological Son         oldest, declines meds     Social History     Socioeconomic History   • Marital status:      Spouse name: None   • Number of children: 3   • Years of education: None   • Highest education level: None   Occupational History   • Occupation: [employer]   Tobacco Use   • Smoking status: Former Smoker   • Smokeless tobacco: Never Used   Vaping Use   • Vaping Use: Never used   Substance and Sexual Activity   • Alcohol use: Yes   • Drug use: None   • Sexual activity: Yes     Partners: Male   Other Topics Concern   • None   Social History Narrative    Children- 2 sons (16, 10), 1 daughter (13). 2021    Caffeine - iced tea    Exercise- yoga - will get back to it    Diet-     Pets-     Hoahaoism affiliation-          Social Determinants of Health     Financial Resource Strain: Not on file   Food Insecurity: Not on file   Transportation Needs: Not on file   Physical Activity: Not on file   Stress: Not on file   Social Connections: Not on file   Intimate Partner Violence: Not on file   Housing Stability: Not on file       Review of Systems   Constitutional: Negative for chills, fatigue, fever and unexpected weight change.   HENT: Positive for congestion. Negative for ear pain, hearing loss, sinus pressure and sore throat.    Eyes: Negative for visual disturbance.   Respiratory: Negative for cough and shortness of breath.    Cardiovascular: Negative for chest pain and palpitations.   Gastrointestinal: Negative for abdominal pain, constipation and diarrhea.   Endocrine: Positive for cold intolerance  (cold feet in the cold or at night). Negative for heat intolerance.   Genitourinary: Negative for difficulty urinating, dysuria, frequency, menstrual problem, pelvic pain, vaginal bleeding and vaginal discharge.   Musculoskeletal: Positive for arthralgias. Negative for back pain, joint swelling and neck pain.   Skin: Negative for rash.   Allergic/Immunologic: Positive for environmental allergies. Negative for food allergies.   Neurological: Positive for dizziness (when getting up) and numbness (toes cold and numb when out in the cold). Negative for seizures, weakness and headaches.   Hematological: Does not bruise/bleed easily.   Psychiatric/Behavioral: Negative for dysphoric mood and sleep disturbance.        Stress       Allergies   Allergen Reactions   • Neomycin Hives   • Neomycin-Bacitracnzn-Polymyxnb Itching   • Penicillins    • Latex Rash   • Miconazole Rash     Current Outpatient Medications   Medication Sig Dispense Refill   • atorvastatin (LIPITOR) 20 mg tablet Take 1 tablet (20 mg total) by mouth daily. 90 tablet 3   • azelastine 137 mcg (0.1 %) nasal spray Administer 1 spray into each nostril 2 (two) times a day. Use in each nostril as directed. 180 mL 3   • buPROPion XL (WELLBUTRIN XL) 300 mg 24 hr tablet Take 1 tablet (300 mg total) by mouth daily. 90 tablet 3   • crisaborole (EUCRISA) 2 % ointment Eucrisa 2 % topical ointment     • fluticasone propionate 50 mcg/actuation nasal spray Administer 1 spray into each nostril 2 (two) times a day. With Astelin 16 g 3   • hydrochlorothiazide (HYDRODIURIL) 25 mg tablet Take 1 tablet (25 mg total) by mouth daily. 90 tablet 3   • losartan (COZAAR) 50 mg tablet Take 1 tablet (50 mg total) by mouth daily. 90 tablet 3   • montelukast (SINGULAIR) 10 mg tablet Take 1 tablet (10 mg total) by mouth once daily. 90 tablet 3   • spironolactone (ALDACTONE) 100 mg tablet Take 1 tablet (100 mg total) by mouth daily. 90 tablet 3     No current facility-administered medications  "for this visit.       Objective   Vitals:    01/31/22 0925   BP: 120/74   Pulse: 85   Resp: 16   Temp: 36.7 °C (98.1 °F)   SpO2: 99%   Weight: 73 kg (161 lb)   Height: 1.575 m (5' 2\")     Body mass index is 29.45 kg/m².    Physical Exam  Constitutional:       Appearance: She is well-developed.   HENT:      Head: Normocephalic and atraumatic.      Right Ear: Ear canal and external ear normal. A middle ear effusion is present.      Left Ear: Ear canal and external ear normal. A middle ear effusion is present.      Nose: Nose normal.   Eyes:      Conjunctiva/sclera: Conjunctivae normal.      Pupils: Pupils are equal, round, and reactive to light.   Neck:      Thyroid: No thyromegaly.   Cardiovascular:      Rate and Rhythm: Normal rate and regular rhythm.      Heart sounds: Normal heart sounds. No murmur heard.  Pulmonary:      Effort: Pulmonary effort is normal. No respiratory distress.      Breath sounds: Normal breath sounds. No wheezing or rales.   Abdominal:      General: Bowel sounds are normal. There is no distension.      Palpations: Abdomen is soft.      Tenderness: There is no abdominal tenderness. There is no guarding or rebound.   Musculoskeletal:         General: Normal range of motion.      Cervical back: Normal range of motion and neck supple.   Skin:     General: Skin is warm and dry.      Findings: No rash.   Neurological:      Mental Status: She is alert and oriented to person, place, and time.      Cranial Nerves: No cranial nerve deficit.      Sensory: No sensory deficit.   Psychiatric:         Behavior: Behavior normal.         Thought Content: Thought content normal.         Judgment: Judgment normal.         Assessment/Plan   Problem List Items Addressed This Visit        Nervous    Eustachian tube dysfunction, left     Effusions in both middle ears.  Increase fluid intake. Use saline nasal spray. Can try getting back on oral allergy med. Continue flonase/astelin. ENT was recommending surgery if " no improvement.           Pelvic pain in female     Improved with better bowel regimen.  Incidental finding of ureterocele.  Advised to make appt with urologist for evaluation            Circulatory    Essential hypertension, benign     Well controlled on losartan 50mg and HCTZ 12.5mg daily. Taking aldactone for skin.            Genitourinary    Ureterocele     Near R.UVJ, seen on CT.  Did not see urologist since pain improved. Advised to make an appt for evaluation to discuss possible work up or intervention            Endocrine/Metabolic    Hyperlipidemia     Well controlled on atorvastatin            Other    Depression     Well controlled on Wellbutrin XL 300mg daily         Encounter for annual physical exam - Primary     Reviewed labs. All normal. UTD with mammogram, pap smear and colonoscopy. UTD with all vaccines.  Advised to get back to yoga regularly. Referred to [facility] and [facility] to help with weight management         BMI 29.0-29.9,adult    Relevant Orders    Ambulatory referral to [facility]    Ambulatory referral to [facility]          Caren Guardado MD    1/31/2022   Spontaneous

## 2024-07-31 NOTE — OB PROVIDER LABOR PROGRESS NOTE - NS_SUBJECTIVE/OBJECTIVE_OBGYN_ALL_OB_FT
Pt seen and examined at bedside for placement of cervical balloon. Pt comfortable and tolerated procedure well.
pt c/o feeling nausea and HA and contractions    T(C): 36.7 (07-31-24 @ 08:49), Max: 36.9 (07-31-24 @ 00:53)  HR: 110 (07-31-24 @ 09:38) (83 - 114)  BP: 133/82 (07-31-24 @ 09:30) (114/71 - 166/107)  RR: 16 (07-31-24 @ 08:49) (16 - 18)  SpO2: 99% (07-31-24 @ 09:38) (87% - 100%)

## 2024-07-31 NOTE — OB RN DELIVERY SUMMARY - NS_SEPSISRSKCALC_OBGYN_ALL_OB_FT
EOS calculated successfully. EOS Risk Factor: 0.5/1000 live births ([facility] national incidence); GA=37w2d; Temp=99.32; ROM=7.567; GBS='Positive'; Antibiotics='GBS specific antibiotics > 2 hrs prior to birth'

## 2024-07-31 NOTE — OB PROVIDER H&P - HISTORY OF PRESENT ILLNESS
29y  at 37w1d who presents to L&D for IOL for PEC. Patient denies vaginal bleeding, contractions and leakage of fluid. She endorses good fetal movement. Denies fevers, chills, nausea, vomiting, chest pain, SOB, dizziness and headache. No other complaints at this time.     SAGAR:     Prenatal course is significant for GDMA1, PEC without SF, GBSpos, EFW 2800g     POB: Denies   PGYN: +remote history of ovarian cysts (resolved), -fibroids, denied STD hx, denies abnormal PAPs  PMH: OCD on prozac  PSH: Denies  SH: Denies tobacco use, EtOH use and illicit drug use during the pregnancy  Meds: Unisom (for sleep, PRN), Prozac 40mg daily  All: NKDA

## 2024-07-31 NOTE — OB RN DELIVERY SUMMARY - NS_RNDELIVATTEST_OBGYN_ALL_OB
OB Provider reported that the vagina was inspected and no foreign body was found/Laps, needles and instrument count was correct Modified Advancement Flap Text: The defect edges were debeveled with a #15 scalpel blade.  Given the location of the defect, shape of the defect and the proximity to free margins a modified advancement flap was deemed most appropriate.  Using a sterile surgical marker, an appropriate advancement flap was drawn incorporating the defect and placing the expected incisions within the relaxed skin tension lines where possible.    The area thus outlined was incised deep to adipose tissue with a #15 scalpel blade.  The skin margins were undermined to an appropriate distance in all directions utilizing iris scissors.

## 2024-07-31 NOTE — OB RN PATIENT PROFILE - TEMPERATURE IN FAHRENHEIT (DEGREES F)
Pt was last seen (by Dr. Pattesron on 9/30 (I saw her previously on 7/29).   13 yr old female with history of abdominal pain and vomiting. Previously improved with constipation therapy but symptoms have recurred. Routine labs, celiac serologies and H. pylori testing are negative. Her pain seems to be due to constipation. However need to consider dyspeptic causes for her vomiting, including reflux or eosinophilic esophagitis, peptic ulcer disease, and allergic gastroenteropathy. Food intolerance is also possible. PLAN: Senna 8.6 mg tabs - 1-2 tabs daily  Increase water intake to 64 oz/day  Sit on the toilet regularly after meals. _______ INTERVAL HISTORY: She went ~2 weeks without vomiting, and no abdominal pain.  Pt awoke and vomited 5 days ago.  She had emesis x1, then no symptoms afterwards, no nausea, no abdominal pain.  She has had 2 episodes of vomiting in the past ~1-2 mos.  No common triggers identified, ie foods, stress.  No symptoms otherwise.   No c/o headaches recently.  No FHx of migraines.   No heartburn, no regurgitation.   Pt is taking Senna 1 tab/day.  Her BMs are improved, she has ~1BM/day, not too hard.    Mom feels that Pt's episodes are less frequent in occurrence, every couple of weeks now.  Nausea/vomiting, not much c/o abdominal pain.   Sometimes she has symptoms when she eats.  Mom is asking about gallbladder disease.   ___  EGD 10/20:  1. Duodenum, Biopsy:    - No histopathologic abnormality     2. Gastric, Biopsy:    - No histopathologic abnormality     3. Distal Esophagus, Biopsy:    - Mild esophagitis with increased eosinophils (~10 Eo/hpf)    4. Proximal Esophagus, Biopsy:    - No histopathologic abnormality  Disaccaridase levels NL.   ___  Meds: Abilify, Clonidine
98.4

## 2024-07-31 NOTE — OB PROVIDER LABOR PROGRESS NOTE - NS_STATION_OBGYN_ALL_OB_NU
[facility]  Nephrology Daily Progress Note    Soco Donaldson  [id number]  78year old      HPI:   Soco Donaldson is a 78year old female. Awake and talking but more confused today. Not really eating or drinking much.        ROS:
1.63*   < > 1.57* 1.77* 1.80*   GFRAA 31*   < > 33* 28* 28*   GFRNAA 27*   < > 28* 24* 24*   CA 9.7   < > 9.6 8.8 9.2   ALB 1.8*   < > 1.7* 1.7* 1.7*   *   < > 132* 133* 133*   K 4.7   < > 4.5 4.4 4.1      < > 103 103 103   CO2 24.0   < > 21.0
-3
Intractable low back pain     ISMAEL (acute kidney injury) ([facility] [address]     Bilateral lower extremity edema     Acute nonintractable headache     Anemia     Acute blood loss anemia     Hiatal hernia     Diverticulosis     Hemorrhage due to chronic Anisa Bahman lesion
-3

## 2024-07-31 NOTE — OB PROVIDER H&P - ASSESSMENT
29y  at 37w1d who presents to L&D for IOL for PEC.    A/P:   -Admit to L&D  -Admission labs, HELLP labs  -NPO  -IV fluids  -Labor: Intact, latent labor, with infrequent contractions  -Fetus: Cat I tracing. Continuous toco and fetal monitoring.   -GBS: POS-> AMP  -GDMA1: FS  -Analgesia: Epi PRN   -Will plan for IOL with PO cytotec.    Plan per Dr. Hali Sumner, PGY-1

## 2024-07-31 NOTE — OB RN PATIENT PROFILE - FALL HARM RISK - UNIVERSAL INTERVENTIONS
Bed in lowest position, wheels locked, appropriate side rails in place/Call bell, personal items and telephone in reach/Instruct patient to call for assistance before getting out of bed or chair/Non-slip footwear when patient is out of bed/[place] to call system/Physically safe environment - no spills, clutter or unnecessary equipment/Purposeful Proactive Rounding/Room/bathroom lighting operational, light cord in reach

## 2024-08-01 LAB
ALBUMIN SERPL ELPH-MCNC: 2.8 G/DL — LOW (ref 3.3–5)
ALP SERPL-CCNC: 186 U/L — HIGH (ref 40–120)
ALT FLD-CCNC: 6 U/L — LOW (ref 10–45)
ANION GAP SERPL CALC-SCNC: 13 MMOL/L — SIGNIFICANT CHANGE UP (ref 5–17)
AST SERPL-CCNC: 21 U/L — SIGNIFICANT CHANGE UP (ref 10–40)
BILIRUB SERPL-MCNC: 0.1 MG/DL — LOW (ref 0.2–1.2)
BUN SERPL-MCNC: 4 MG/DL — LOW (ref 7–23)
CALCIUM SERPL-MCNC: 7.1 MG/DL — LOW (ref 8.4–10.5)
CHLORIDE SERPL-SCNC: 99 MMOL/L — SIGNIFICANT CHANGE UP (ref 96–108)
CO2 SERPL-SCNC: 20 MMOL/L — LOW (ref 22–31)
CREAT SERPL-MCNC: 0.73 MG/DL — SIGNIFICANT CHANGE UP (ref 0.5–1.3)
EGFR: 113 ML/MIN/1.73M2 — SIGNIFICANT CHANGE UP
GLUCOSE SERPL-MCNC: 136 MG/DL — HIGH (ref 70–99)
LDH SERPL L TO P-CCNC: 302 U/L — HIGH (ref 50–242)
MAGNESIUM SERPL-MCNC: 6.7 MG/DL — HIGH (ref 1.6–2.6)
MAGNESIUM SERPL-MCNC: 6.7 MG/DL — HIGH (ref 1.6–2.6)
POTASSIUM SERPL-MCNC: 4.4 MMOL/L — SIGNIFICANT CHANGE UP (ref 3.5–5.3)
POTASSIUM SERPL-SCNC: 4.4 MMOL/L — SIGNIFICANT CHANGE UP (ref 3.5–5.3)
PROT SERPL-MCNC: 5 G/DL — LOW (ref 6–8.3)
SODIUM SERPL-SCNC: 132 MMOL/L — LOW (ref 135–145)
T PALLIDUM AB TITR SER: NEGATIVE — SIGNIFICANT CHANGE UP
URATE SERPL-MCNC: 6.4 MG/DL — SIGNIFICANT CHANGE UP (ref 2.5–7)

## 2024-08-01 RX ORDER — IBUPROFEN 200 MG
600 TABLET ORAL EVERY 6 HOURS
Refills: 0 | Status: DISCONTINUED | OUTPATIENT
Start: 2024-08-01 | End: 2024-08-05

## 2024-08-01 RX ORDER — MAGNESIUM SULFATE 500 MG/ML
2 VIAL (ML) INJECTION
Qty: 40 | Refills: 0 | Status: DISCONTINUED | OUTPATIENT
Start: 2024-08-01 | End: 2024-08-01

## 2024-08-01 RX ORDER — DEXTROSE MONOHYDRATE, SODIUM CHLORIDE, SODIUM LACTATE, CALCIUM CHLORIDE, MAGNESIUM CHLORIDE 1.5; 538; 448; 18.4; 5.08 G/100ML; MG/100ML; MG/100ML; MG/100ML; MG/100ML
1000 SOLUTION INTRAPERITONEAL
Refills: 0 | Status: DISCONTINUED | OUTPATIENT
Start: 2024-08-01 | End: 2024-08-05

## 2024-08-01 RX ADMIN — Medication 1 TABLET(S): at 13:28

## 2024-08-01 RX ADMIN — Medication 975 MILLIGRAM(S): at 14:09

## 2024-08-01 RX ADMIN — Medication 600 MILLIGRAM(S): at 10:21

## 2024-08-01 RX ADMIN — Medication 600 MILLIGRAM(S): at 03:05

## 2024-08-01 RX ADMIN — Medication 600 MILLIGRAM(S): at 16:40

## 2024-08-01 RX ADMIN — Medication 975 MILLIGRAM(S): at 19:25

## 2024-08-01 RX ADMIN — Medication 600 MILLIGRAM(S): at 21:38

## 2024-08-01 RX ADMIN — Medication 600 MILLIGRAM(S): at 15:43

## 2024-08-01 RX ADMIN — Medication 600 MILLIGRAM(S): at 22:35

## 2024-08-01 RX ADMIN — NIFEDIPINE 30 MILLIGRAM(S): 20 CAPSULE, LIQUID FILLED ORAL at 06:11

## 2024-08-01 RX ADMIN — Medication 975 MILLIGRAM(S): at 06:11

## 2024-08-01 RX ADMIN — Medication 975 MILLIGRAM(S): at 07:10

## 2024-08-01 RX ADMIN — Medication 975 MILLIGRAM(S): at 18:53

## 2024-08-01 RX ADMIN — Medication 600 MILLIGRAM(S): at 11:20

## 2024-08-01 RX ADMIN — Medication 600 MILLIGRAM(S): at 02:05

## 2024-08-01 RX ADMIN — Medication 40 MILLIGRAM(S): at 13:27

## 2024-08-01 RX ADMIN — Medication 3 MILLILITER(S): at 14:09

## 2024-08-01 RX ADMIN — Medication 975 MILLIGRAM(S): at 13:27

## 2024-08-01 NOTE — PROGRESS NOTE ADULT - SUBJECTIVE AND OBJECTIVE BOX
OB Progress Note:  PPD#1    S: 30yo  PPD#1 s/p , initially admitted for PEC then met criteria for sPEC intrapartum. Patient feels well. Pain is well controlled. She is tolerating a regular diet and passing flatus. She is voiding spontaneously, and ambulating without difficulty. Denies CP/SOB. Denies lightheadedness/dizziness. Denies N/V.    O:  Vital Signs Last 24 Hrs  T(C): 36.3 (01 Aug 2024 02:00), Max: 37.4 (2024 20:36)  T(F): 97.3 (01 Aug 2024 02:00), Max: 99.32 (2024 20:36)  HR: 106 (01 Aug 2024 03:53) (75 - 156)  BP: 128/84 (01 Aug 2024 03:53) (87/58 - 205/92)  RR: 18 (01 Aug 2024 03:53) (16 - 18)  SpO2: 99% (01 Aug 2024 03:53) (79% - 100%)    Parameters below as of 01 Aug 2024 03:53  Patient On (Oxygen Delivery Method): room air    MEDICATIONS  (STANDING):  acetaminophen     Tablet .. 975 milliGRAM(s) Oral <User Schedule>  diphtheria/tetanus/pertussis (acellular) Vaccine (Adacel) 0.5 milliLiter(s) IntraMuscular once  FLUoxetine 40 milliGRAM(s) Oral daily  ibuprofen  Tablet. 600 milliGRAM(s) Oral every 6 hours  ketorolac   Injectable 30 milliGRAM(s) IV Push once  lactated ringers. 1000 milliLiter(s) (50 mL/Hr) IV Continuous <Continuous>  magnesium sulfate Infusion 2 Gm/Hr (50 mL/Hr) IV Continuous <Continuous>  NIFEdipine XL 30 milliGRAM(s) Oral daily  oxytocin Infusion 41.667 milliUNIT(s)/Min (125 mL/Hr) IV Continuous <Continuous>  prenatal multivitamin 1 Tablet(s) Oral daily  sodium chloride 0.9% lock flush 3 milliLiter(s) IV Push every 8 hours    Labs:  Blood type: A Positive  Rubella IgG: RPR: Negative                          9.5<L>   12.02<H> >-----------< 336    (  @ 06:24 )             30.8<L>                        8.5<L>   9.85 >-----------< 309    (  @ 00:39 )             26.4<L>    24 @ 03:45      132<L>  |  99  |  4<L>  ----------------------------<  136<H>  4.4   |  20<L>  |  0.73    24 @ 06:24      136  |  102  |  6<L>  ----------------------------<  86  3.8   |  18<L>  |  0.59    24 @ 00:39      134<L>  |  103  |  8   ----------------------------<  70  4.1   |  20<L>  |  0.61        Ca    7.1<L>      01 Aug 2024 03:45  Ca    8.5      2024 06:24  Ca    8.7      2024 00:39  Mg     6.7<H>       Mg     7.4<H>       Mg     6.6<H>       Mg     5.4<H>         TPro  5.0<L>  /  Alb  2.8<L>  /  TBili  0.1<L>  /  DBili  x   /  AST  21  /  ALT  6<L>  /  AlkPhos  186<H>  24 @ 03:45  TPro  6.6  /  Alb  3.4  /  TBili  0.1<L>  /  DBili  x   /  AST  14  /  ALT  7<L>  /  AlkPhos  285<H>  24 @ 06:24  TPro  6.0  /  Alb  3.3  /  TBili  0.1<L>  /  DBili  x   /  AST  12  /  ALT  7<L>  /  AlkPhos  252<H>  24 @ 00:39    Physical Exam:  General: NAD  Abdomen: soft, non-tender, non-distended, fundus firm  Vaginal: Lochia wnl  Extremities: No erythema/edema

## 2024-08-01 NOTE — PROGRESS NOTE ADULT - ASSESSMENT
A/P: 30yo PPD#1 s/p , initially admitted for PEC then met criteria for sPEC intrapartum. Patient is stable and doing well post-partum.     #sPEC  - Continue Mg for seizure ppx until @8p  - Continue [id number] daily  - AM HELLP labs     #PP state  - Pain well controlled, continue current pain regimen  - Increase ambulation, SCDs when not ambulating  - Continue regular diet    Cat Carlin, PGY3

## 2024-08-02 LAB
ALBUMIN SERPL ELPH-MCNC: 2.7 G/DL — LOW (ref 3.3–5)
ALP SERPL-CCNC: 154 U/L — HIGH (ref 40–120)
ALT FLD-CCNC: 11 U/L — SIGNIFICANT CHANGE UP (ref 10–45)
ANION GAP SERPL CALC-SCNC: 10 MMOL/L — SIGNIFICANT CHANGE UP (ref 5–17)
AST SERPL-CCNC: 15 U/L — SIGNIFICANT CHANGE UP (ref 10–40)
BASOPHILS # BLD AUTO: 0 K/UL — SIGNIFICANT CHANGE UP (ref 0–0.2)
BASOPHILS NFR BLD AUTO: 0 % — SIGNIFICANT CHANGE UP (ref 0–2)
BILIRUB SERPL-MCNC: <0.1 MG/DL — LOW (ref 0.2–1.2)
BUN SERPL-MCNC: 11 MG/DL — SIGNIFICANT CHANGE UP (ref 7–23)
CALCIUM SERPL-MCNC: 8.4 MG/DL — SIGNIFICANT CHANGE UP (ref 8.4–10.5)
CHLORIDE SERPL-SCNC: 108 MMOL/L — SIGNIFICANT CHANGE UP (ref 96–108)
CO2 SERPL-SCNC: 23 MMOL/L — SIGNIFICANT CHANGE UP (ref 22–31)
CREAT SERPL-MCNC: 0.79 MG/DL — SIGNIFICANT CHANGE UP (ref 0.5–1.3)
EGFR: 102 ML/MIN/1.73M2 — SIGNIFICANT CHANGE UP
EOSINOPHIL # BLD AUTO: 0 K/UL — SIGNIFICANT CHANGE UP (ref 0–0.5)
EOSINOPHIL NFR BLD AUTO: 0 % — SIGNIFICANT CHANGE UP (ref 0–6)
GLUCOSE SERPL-MCNC: 88 MG/DL — SIGNIFICANT CHANGE UP (ref 70–99)
HCT VFR BLD CALC: 14.9 % — CRITICAL LOW (ref 34.5–45)
HCT VFR BLD CALC: 15.4 % — CRITICAL LOW (ref 34.5–45)
HCT VFR BLD CALC: 26.9 % — LOW (ref 34.5–45)
HGB BLD-MCNC: 4.8 G/DL — CRITICAL LOW (ref 11.5–15.5)
HGB BLD-MCNC: 4.8 G/DL — CRITICAL LOW (ref 11.5–15.5)
HGB BLD-MCNC: 8.8 G/DL — LOW (ref 11.5–15.5)
LDH SERPL L TO P-CCNC: 254 U/L — HIGH (ref 50–242)
LYMPHOCYTES # BLD AUTO: 2.56 K/UL — SIGNIFICANT CHANGE UP (ref 1–3.3)
LYMPHOCYTES # BLD AUTO: 23.9 % — SIGNIFICANT CHANGE UP (ref 13–44)
MANUAL SMEAR VERIFICATION: SIGNIFICANT CHANGE UP
MCHC RBC-ENTMCNC: 25.3 PG — LOW (ref 27–34)
MCHC RBC-ENTMCNC: 25.7 PG — LOW (ref 27–34)
MCHC RBC-ENTMCNC: 27.4 PG — SIGNIFICANT CHANGE UP (ref 27–34)
MCHC RBC-ENTMCNC: 31.2 GM/DL — LOW (ref 32–36)
MCHC RBC-ENTMCNC: 32.2 GM/DL — SIGNIFICANT CHANGE UP (ref 32–36)
MCHC RBC-ENTMCNC: 32.7 GM/DL — SIGNIFICANT CHANGE UP (ref 32–36)
MCV RBC AUTO: 79.7 FL — LOW (ref 80–100)
MCV RBC AUTO: 81.1 FL — SIGNIFICANT CHANGE UP (ref 80–100)
MCV RBC AUTO: 83.8 FL — SIGNIFICANT CHANGE UP (ref 80–100)
MONOCYTES # BLD AUTO: 0.95 K/UL — HIGH (ref 0–0.9)
MONOCYTES NFR BLD AUTO: 8.9 % — SIGNIFICANT CHANGE UP (ref 2–14)
NEUTROPHILS # BLD AUTO: 7.21 K/UL — SIGNIFICANT CHANGE UP (ref 1.8–7.4)
NEUTROPHILS NFR BLD AUTO: 61.9 % — SIGNIFICANT CHANGE UP (ref 43–77)
NEUTS BAND # BLD: 5.3 % — SIGNIFICANT CHANGE UP (ref 0–8)
NRBC # BLD: 0 /100 WBCS — SIGNIFICANT CHANGE UP (ref 0–0)
NRBC # BLD: 0 /100 WBCS — SIGNIFICANT CHANGE UP (ref 0–0)
NRBC # BLD: 1 /100 WBCS — HIGH (ref 0–0)
PLAT MORPH BLD: NORMAL — SIGNIFICANT CHANGE UP
PLATELET # BLD AUTO: 233 K/UL — SIGNIFICANT CHANGE UP (ref 150–400)
PLATELET # BLD AUTO: 240 K/UL — SIGNIFICANT CHANGE UP (ref 150–400)
PLATELET # BLD AUTO: 309 K/UL — SIGNIFICANT CHANGE UP (ref 150–400)
POTASSIUM SERPL-MCNC: 4.4 MMOL/L — SIGNIFICANT CHANGE UP (ref 3.5–5.3)
POTASSIUM SERPL-SCNC: 4.4 MMOL/L — SIGNIFICANT CHANGE UP (ref 3.5–5.3)
PROT SERPL-MCNC: 4.8 G/DL — LOW (ref 6–8.3)
RBC # BLD: 1.87 M/UL — LOW (ref 3.8–5.2)
RBC # BLD: 1.9 M/UL — LOW (ref 3.8–5.2)
RBC # BLD: 3.21 M/UL — LOW (ref 3.8–5.2)
RBC # FLD: 15.4 % — HIGH (ref 10.3–14.5)
RBC # FLD: 15.5 % — HIGH (ref 10.3–14.5)
RBC # FLD: 15.5 % — HIGH (ref 10.3–14.5)
RBC BLD AUTO: SIGNIFICANT CHANGE UP
SODIUM SERPL-SCNC: 141 MMOL/L — SIGNIFICANT CHANGE UP (ref 135–145)
URATE SERPL-MCNC: 6.5 MG/DL — SIGNIFICANT CHANGE UP (ref 2.5–7)
WBC # BLD: 10.73 K/UL — HIGH (ref 3.8–10.5)
WBC # BLD: 10.76 K/UL — HIGH (ref 3.8–10.5)
WBC # BLD: 14.77 K/UL — HIGH (ref 3.8–10.5)
WBC # FLD AUTO: 10.73 K/UL — HIGH (ref 3.8–10.5)
WBC # FLD AUTO: 10.76 K/UL — HIGH (ref 3.8–10.5)
WBC # FLD AUTO: 14.77 K/UL — HIGH (ref 3.8–10.5)

## 2024-08-02 PROCEDURE — 99231 SBSQ HOSP IP/OBS SF/LOW 25: CPT

## 2024-08-02 RX ORDER — DIPHENHYDRAMINE HCL 25 MG
25 CAPSULE ORAL ONCE
Refills: 0 | Status: COMPLETED | OUTPATIENT
Start: 2024-08-02 | End: 2024-08-02

## 2024-08-02 RX ADMIN — Medication 600 MILLIGRAM(S): at 04:34

## 2024-08-02 RX ADMIN — NIFEDIPINE 30 MILLIGRAM(S): 20 CAPSULE, LIQUID FILLED ORAL at 05:35

## 2024-08-02 RX ADMIN — Medication 600 MILLIGRAM(S): at 20:15

## 2024-08-02 RX ADMIN — Medication 975 MILLIGRAM(S): at 18:02

## 2024-08-02 RX ADMIN — Medication 975 MILLIGRAM(S): at 06:22

## 2024-08-02 RX ADMIN — Medication 975 MILLIGRAM(S): at 00:10

## 2024-08-02 RX ADMIN — Medication 600 MILLIGRAM(S): at 15:40

## 2024-08-02 RX ADMIN — Medication 25 MILLIGRAM(S): at 09:30

## 2024-08-02 RX ADMIN — Medication 3 MILLILITER(S): at 00:29

## 2024-08-02 RX ADMIN — Medication 40 MILLIGRAM(S): at 12:09

## 2024-08-02 RX ADMIN — Medication 600 MILLIGRAM(S): at 02:56

## 2024-08-02 RX ADMIN — Medication 1 TABLET(S): at 12:09

## 2024-08-02 RX ADMIN — Medication 600 MILLIGRAM(S): at 10:30

## 2024-08-02 RX ADMIN — Medication 975 MILLIGRAM(S): at 13:05

## 2024-08-02 RX ADMIN — Medication 600 MILLIGRAM(S): at 09:30

## 2024-08-02 RX ADMIN — Medication 3 MILLILITER(S): at 12:44

## 2024-08-02 RX ADMIN — Medication 975 MILLIGRAM(S): at 12:09

## 2024-08-02 RX ADMIN — Medication 975 MILLIGRAM(S): at 05:34

## 2024-08-02 RX ADMIN — Medication 975 MILLIGRAM(S): at 19:00

## 2024-08-02 RX ADMIN — Medication 3 MILLILITER(S): at 22:15

## 2024-08-02 RX ADMIN — Medication 600 MILLIGRAM(S): at 14:43

## 2024-08-02 RX ADMIN — Medication 600 MILLIGRAM(S): at 20:45

## 2024-08-02 RX ADMIN — Medication 3 MILLILITER(S): at 06:22

## 2024-08-02 RX ADMIN — Medication 975 MILLIGRAM(S): at 01:13

## 2024-08-02 RX ADMIN — Medication 975 MILLIGRAM(S): at 23:42

## 2024-08-02 NOTE — PROGRESS NOTE ADULT - ASSESSMENT
A/P: 30yo PPD#2 s/p , initially admitted for PEC then met criteria for sPEC intrapartum. Patient is stable and doing well post-partum.     #sPEC (BP)  - Now s/p Mg for seizure ppx  - Continue [id number] daily  - Overnight BPs 130s-140s/80s-90s  - AM HELLP labs     #PP state  - Pain well controlled, continue current pain regimen  - Increase ambulation, SCDs when not ambulating  - Continue regular diet  - Discharge planning per private attending    Cat Carlin, PGY3

## 2024-08-02 NOTE — PROGRESS NOTE ADULT - NS ATTEND AMEND GEN_ALL_CORE FT
doing well. no sx of anemia. stable hgb of 4.8.   no pain with po pain meds.   VSS afebrile  -140/70-80s  ppd2 doing well  on procardia xl 30mg po daily  s/p first unit of pRBC  getting 2nd pRBC  routine postpartum care  mercy joy md

## 2024-08-02 NOTE — CHART NOTE - NSCHARTNOTEFT_GEN_A_CORE
Ob attending note    Pt examined at bedside.    ICU Vital Signs Last 24 Hrs  T(C): 36.8 (31 Jul 2024 10:49), Max: 36.9 (31 Jul 2024 00:53)  T(F): 98.24 (31 Jul 2024 10:49), Max: 98.42 (31 Jul 2024 03:15)  HR: 92 (31 Jul 2024 12:52) (75 - 114)  BP: 109/68 (31 Jul 2024 12:52) (87/58 - 183/70)  BP(mean): --  ABP: --  ABP(mean): --  RR: 16 (31 Jul 2024 10:49) (16 - 18)  SpO2: 79% (31 Jul 2024 12:52) (79% - 100%)    O2 Parameters below as of 31 Jul 2024 00:53  Patient On (Oxygen Delivery Method): room air    VE: 5..5/60/-2; AROM - clear  EFM: reactive  [place]: irregular    IOL for sPEC  -c/w pit for IOL  -efm/toco  -Mg for sPEC    MD Amisha
Patient seen for: Nutrition consult for GDM postpartum education prior to discharge    Source: Patient, Electronic Medical Record    Patient is: ; GDMA1    Chart reviewed, events noted. Meds/Labs reviewed.    Anthropometrics:  Height: 5 feet 0 inches  Pre-pregnancy weight: 145 pounds   Weight gain: ~15 pounds   Admit/Dosing wt: 160.9 pounds     Nutrition Diagnosis:   Food and Nutrition Related Knowledge Deficit related to limited previous exposure to postpartum GDM nutrition education and recommendations as evidenced by GDM postpartum.    Goal: Pt to state at least two teach back points.    Intervention:  1. Education: Pt educated on postpartum dietary recommendations, including risk of development of T2DM; reinforced importance of DM screening 4-12 weeks postpartum. Reviewed nutrition recommendations for breast feeding, including adequate protein, calorie, and fluid intake.   2. Handout: "What to expect now that you have had your baby"     Monitoring:  RD remains available upon request and will follow up per protocol.    Shawna Luu, KAVYA, CDN  TEAMS
Ob attending note    Pt examined at bedside. Audible decel noted to annmarie of 80s. Tetanic ctx noted. Pit dc'd. ctx resolved with FHR improving to baseline of 110/115. VE 9/90/-2. C/w pt resuscitation with repositioning, IVF (though careful with bolusing as pt w/sPEC). Peanut ball.    MD Amisha
Patient seen at bedside for sustained severe range BPs.   Patient denies headache, blurry vision, shortness of breath, epigastric pain, nausea, or vomiting.   Labetalol 20 mg IVP given   Procardia 30XL ordered   HELLP labs wnl   Magnesium for seizure prophylaxis   Patient counseled on need for outpatient BP monitoring as well as symptomatic self-assessment once discharged  Patient understands risk of stroke and seizure, agrees with plan of care    Sana Chatman, PGY4  d/w Dr. Lal
RYDER FRIEND Postpartum    Day 2     S: I went to assess Ms. SaucedoeneyThisai due to confirmed critical H/H of 4.8.  Pt feels well and has no complaints. Pt actually reports feeling much better than yesterday since Mgs04 discontinued. She has been ambulating, voiding, tolerating regular diet and reports no symptoms of anemia. 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       Vital Signs Last 24 Hrs  T(C): 36.7 (02 Aug 2024 08:20), Max: 37.3 (02 Aug 2024 00:18)  T(F): 98.1 (02 Aug 2024 08:20), Max: 99.1 (02 Aug 2024 00:18)  HR: 94 (02 Aug 2024 08:20) (94 - 104)  BP: 130/85 (02 Aug 2024 08:20) (101/67 - 145/94)  BP(mean): --  RR: 18 (02 Aug 2024 08:20) (18 - 18)  SpO2: 98% (02 Aug 2024 08:20) (95% - 98%)    Parameters below as of 02 Aug 2024 08:20  Patient On (Oxygen Delivery Method): room air    cards: Clear S1S2  pulm: breathing unlabored  abd: soft, nontender throughout. No guarding/rebound or palpable masses.   : Lochia normal (40% of pad saturated)                          4.8    10.76 )-----------( 233      ( 02 Aug 2024 07:41 )             14.9   baso x      eos x      imm gran x      lymph x      mono x      poly x                            4.8    10.73 )-----------( 240      ( 02 Aug 2024 06:29 )             15.4   baso 0.0    eos 0.0    imm gran x      lymph 23.9   mono 8.9    poly 61.9                         9.5    12.02 )-----------( 336      ( 2024 06:24 )             30.8   baso 0.3    eos 0.2    imm gran 0.7    lymph 20.1   mono 6.2    poly 72.5                         8.5    9.85  )-----------( 309      ( 2024 00:39 )             26.4   baso 0.2    eos 0.7    imm gran 0.6    lymph 26.4   mono 8.8    poly 63.3         Plan: Severe anemia secondary to acute blood loss due to  ([id number], w/immediate Hemabate x 1, Cytotec)   -Pt hemodynamically stable and asymptomatic but will pre-treat w/ Tylenol and Benadryl and administer 2 units of blood.  -EKG STAT   -Repeat CBC post-transfusion.  -Low suspicion of intraabdominal bleed given no surgical history/benign abdomen. If CBC does not rise appropriately, would consider imaging.   -Patient agrees to blood transfusion.  d/w OB team and Dr. Ramos.   PHUC Lee

## 2024-08-02 NOTE — PROVIDER CONTACT NOTE (CRITICAL VALUE NOTIFICATION) - ASSESSMENT
pt asymptomatic, vitals stable
Patient denies any headache, dizziness, lightheadedness  Vital signs wnl

## 2024-08-02 NOTE — PROGRESS NOTE ADULT - SUBJECTIVE AND OBJECTIVE BOX
OB Progress Note:  PPD#2    S: 28yo  PPD#2 s/p , initially admitted for PEC then met criteria for sPEC intrapartum. Patient feels well. Pain is well controlled. She is tolerating a regular diet and passing flatus. She is voiding spontaneously, and ambulating without difficulty. Denies CP/SOB. Denies lightheadedness/dizziness. Denies N/V.    O:  Vital Signs Last 24 Hrs  T(C): 37.3 (02 Aug 2024 00:18), Max: 37.3 (02 Aug 2024 00:18)  T(F): 99.1 (02 Aug 2024 00:18), Max: 99.1 (02 Aug 2024 00:18)  HR: 101 (02 Aug 2024 00:18) (96 - 104)  BP: 131/87 (02 Aug 2024 00:18) (101/67 - 145/94)  RR: 18 (02 Aug 2024 00:18) (18 - 18)  SpO2: 97% (02 Aug 2024 00:18) (97% - 100%)    Parameters below as of 02 Aug 2024 00:18  Patient On (Oxygen Delivery Method): room air    MEDICATIONS  (STANDING):  acetaminophen     Tablet .. 975 milliGRAM(s) Oral <User Schedule>  diphtheria/tetanus/pertussis (acellular) Vaccine (Adacel) 0.5 milliLiter(s) IntraMuscular once  FLUoxetine 40 milliGRAM(s) Oral daily  ibuprofen  Tablet. 600 milliGRAM(s) Oral every 6 hours  ketorolac   Injectable 30 milliGRAM(s) IV Push once  lactated ringers. 1000 milliLiter(s) (50 mL/Hr) IV Continuous <Continuous>  magnesium sulfate Infusion 2 Gm/Hr (50 mL/Hr) IV Continuous <Continuous>  NIFEdipine XL 30 milliGRAM(s) Oral daily  oxytocin Infusion 41.667 milliUNIT(s)/Min (125 mL/Hr) IV Continuous <Continuous>  prenatal multivitamin 1 Tablet(s) Oral daily  sodium chloride 0.9% lock flush 3 milliLiter(s) IV Push every 8 hours    Labs:  Blood type: A Positive  Rubella IgG: RPR: Negative                          9.5<L>   12.02<H> >-----------< 336    (  @ 06:24 )             30.8<L>                        8.5<L>   9.85 >-----------< 309    (  @ 00:39 )             26.4<L>    24 @ 03:45      132<L>  |  99  |  4<L>  ----------------------------<  136<H>  4.4   |  20<L>  |  0.73    24 @ 06:24      136  |  102  |  6<L>  ----------------------------<  86  3.8   |  18<L>  |  0.59    24 @ 00:39      134<L>  |  103  |  8   ----------------------------<  70  4.1   |  20<L>  |  0.61        Ca    7.1<L>      01 Aug 2024 03:45  Ca    8.5      2024 06:24  Ca    8.7      2024 00:39  Mg     6.7<H>       Mg     7.4<H>       Mg     6.6<H>       Mg     5.4<H>         TPro  5.0<L>  /  Alb  2.8<L>  /  TBili  0.1<L>  /  DBili  x   /  AST  21  /  ALT  6<L>  /  AlkPhos  186<H>  24 @ 03:45  TPro  6.6  /  Alb  3.4  /  TBili  0.1<L>  /  DBili  x   /  AST  14  /  ALT  7<L>  /  AlkPhos  285<H>  24 @ 06:24  TPro  6.0  /  Alb  3.3  /  TBili  0.1<L>  /  DBili  x   /  AST  12  /  ALT  7<L>  /  AlkPhos  252<H>  24 @ 00:39    Physical Exam:  General: NAD  Abdomen: soft, non-tender, non-distended, fundus firm  Vaginal: Lochia wnl  Extremities: No erythema/edema

## 2024-08-02 NOTE — PROVIDER CONTACT NOTE (CRITICAL VALUE NOTIFICATION) - ACTION/TREATMENT ORDERED:
Dr BENY Darnell aware. He came to the bedside to assess pt and ordered 2 units PRBC for pt and 1 hour repeat CBC
repeat CBC

## 2024-08-03 ENCOUNTER — TRANSCRIPTION ENCOUNTER (OUTPATIENT)
Age: 29
End: 2024-08-03

## 2024-08-03 DIAGNOSIS — F41.9 ANXIETY DISORDER, UNSPECIFIED: ICD-10-CM

## 2024-08-03 LAB
ALBUMIN SERPL ELPH-MCNC: 3 G/DL — LOW (ref 3.3–5)
ALP SERPL-CCNC: 178 U/L — HIGH (ref 40–120)
ALT FLD-CCNC: 15 U/L — SIGNIFICANT CHANGE UP (ref 10–45)
ANION GAP SERPL CALC-SCNC: 12 MMOL/L — SIGNIFICANT CHANGE UP (ref 5–17)
AST SERPL-CCNC: 23 U/L — SIGNIFICANT CHANGE UP (ref 10–40)
BASOPHILS # BLD AUTO: 0.05 K/UL — SIGNIFICANT CHANGE UP (ref 0–0.2)
BASOPHILS NFR BLD AUTO: 0.4 % — SIGNIFICANT CHANGE UP (ref 0–2)
BILIRUB SERPL-MCNC: 0.1 MG/DL — LOW (ref 0.2–1.2)
BUN SERPL-MCNC: 8 MG/DL — SIGNIFICANT CHANGE UP (ref 7–23)
CALCIUM SERPL-MCNC: 8.7 MG/DL — SIGNIFICANT CHANGE UP (ref 8.4–10.5)
CHLORIDE SERPL-SCNC: 104 MMOL/L — SIGNIFICANT CHANGE UP (ref 96–108)
CO2 SERPL-SCNC: 23 MMOL/L — SIGNIFICANT CHANGE UP (ref 22–31)
CREAT SERPL-MCNC: 0.62 MG/DL — SIGNIFICANT CHANGE UP (ref 0.5–1.3)
EGFR: 122 ML/MIN/1.73M2 — SIGNIFICANT CHANGE UP
EOSINOPHIL # BLD AUTO: 0.23 K/UL — SIGNIFICANT CHANGE UP (ref 0–0.5)
EOSINOPHIL NFR BLD AUTO: 1.6 % — SIGNIFICANT CHANGE UP (ref 0–6)
GLUCOSE SERPL-MCNC: 68 MG/DL — LOW (ref 70–99)
HCT VFR BLD CALC: 25.3 % — LOW (ref 34.5–45)
HGB BLD-MCNC: 8.4 G/DL — LOW (ref 11.5–15.5)
IMM GRANULOCYTES NFR BLD AUTO: 1.3 % — HIGH (ref 0–0.9)
LDH SERPL L TO P-CCNC: 272 U/L — HIGH (ref 50–242)
LYMPHOCYTES # BLD AUTO: 23.4 % — SIGNIFICANT CHANGE UP (ref 13–44)
LYMPHOCYTES # BLD AUTO: 3.31 K/UL — HIGH (ref 1–3.3)
MCHC RBC-ENTMCNC: 27.5 PG — SIGNIFICANT CHANGE UP (ref 27–34)
MCHC RBC-ENTMCNC: 33.2 GM/DL — SIGNIFICANT CHANGE UP (ref 32–36)
MCV RBC AUTO: 83 FL — SIGNIFICANT CHANGE UP (ref 80–100)
MONOCYTES # BLD AUTO: 1.18 K/UL — HIGH (ref 0–0.9)
MONOCYTES NFR BLD AUTO: 8.3 % — SIGNIFICANT CHANGE UP (ref 2–14)
NEUTROPHILS # BLD AUTO: 9.19 K/UL — HIGH (ref 1.8–7.4)
NEUTROPHILS NFR BLD AUTO: 65 % — SIGNIFICANT CHANGE UP (ref 43–77)
NRBC # BLD: 0 /100 WBCS — SIGNIFICANT CHANGE UP (ref 0–0)
PLATELET # BLD AUTO: 283 K/UL — SIGNIFICANT CHANGE UP (ref 150–400)
POTASSIUM SERPL-MCNC: 4.1 MMOL/L — SIGNIFICANT CHANGE UP (ref 3.5–5.3)
POTASSIUM SERPL-SCNC: 4.1 MMOL/L — SIGNIFICANT CHANGE UP (ref 3.5–5.3)
PROT SERPL-MCNC: 5.9 G/DL — LOW (ref 6–8.3)
RBC # BLD: 3.05 M/UL — LOW (ref 3.8–5.2)
RBC # FLD: 15.4 % — HIGH (ref 10.3–14.5)
SODIUM SERPL-SCNC: 139 MMOL/L — SIGNIFICANT CHANGE UP (ref 135–145)
URATE SERPL-MCNC: 5.5 MG/DL — SIGNIFICANT CHANGE UP (ref 2.5–7)
WBC # BLD: 14.14 K/UL — HIGH (ref 3.8–10.5)
WBC # FLD AUTO: 14.14 K/UL — HIGH (ref 3.8–10.5)

## 2024-08-03 RX ORDER — FLUOXETINE HCL 10 MG
1 CAPSULE ORAL
Qty: 0 | Refills: 0 | DISCHARGE
Start: 2024-08-03

## 2024-08-03 RX ORDER — ACETAMINOPHEN 500 MG
3 TABLET ORAL
Qty: 0 | Refills: 0 | DISCHARGE
Start: 2024-08-03

## 2024-08-03 RX ORDER — NIFEDIPINE 20 MG/1
30 CAPSULE, LIQUID FILLED ORAL ONCE
Refills: 0 | Status: COMPLETED | OUTPATIENT
Start: 2024-08-03 | End: 2024-08-03

## 2024-08-03 RX ORDER — NIFEDIPINE 20 MG/1
60 CAPSULE, LIQUID FILLED ORAL DAILY
Refills: 0 | Status: DISCONTINUED | OUTPATIENT
Start: 2024-08-04 | End: 2024-08-05

## 2024-08-03 RX ORDER — NIFEDIPINE 20 MG/1
1 CAPSULE, LIQUID FILLED ORAL
Qty: 0 | Refills: 0 | DISCHARGE
Start: 2024-08-03

## 2024-08-03 RX ORDER — IBUPROFEN 200 MG
1 TABLET ORAL
Qty: 0 | Refills: 0 | DISCHARGE
Start: 2024-08-03

## 2024-08-03 RX ORDER — NIFEDIPINE 20 MG/1
1 CAPSULE, LIQUID FILLED ORAL
Qty: 20 | Refills: 0
Start: 2024-08-03

## 2024-08-03 RX ADMIN — Medication 975 MILLIGRAM(S): at 05:35

## 2024-08-03 RX ADMIN — Medication 975 MILLIGRAM(S): at 14:01

## 2024-08-03 RX ADMIN — Medication 975 MILLIGRAM(S): at 18:21

## 2024-08-03 RX ADMIN — Medication 975 MILLIGRAM(S): at 06:16

## 2024-08-03 RX ADMIN — Medication 975 MILLIGRAM(S): at 13:17

## 2024-08-03 RX ADMIN — Medication 600 MILLIGRAM(S): at 03:36

## 2024-08-03 RX ADMIN — Medication 600 MILLIGRAM(S): at 03:06

## 2024-08-03 RX ADMIN — Medication 1 TABLET(S): at 13:17

## 2024-08-03 RX ADMIN — Medication 600 MILLIGRAM(S): at 14:50

## 2024-08-03 RX ADMIN — NIFEDIPINE 30 MILLIGRAM(S): 20 CAPSULE, LIQUID FILLED ORAL at 06:11

## 2024-08-03 RX ADMIN — NIFEDIPINE 30 MILLIGRAM(S): 20 CAPSULE, LIQUID FILLED ORAL at 05:38

## 2024-08-03 RX ADMIN — Medication 975 MILLIGRAM(S): at 18:58

## 2024-08-03 RX ADMIN — Medication 600 MILLIGRAM(S): at 09:24

## 2024-08-03 RX ADMIN — Medication 600 MILLIGRAM(S): at 10:05

## 2024-08-03 RX ADMIN — Medication 975 MILLIGRAM(S): at 23:20

## 2024-08-03 RX ADMIN — Medication 40 MILLIGRAM(S): at 13:17

## 2024-08-03 RX ADMIN — Medication 3 MILLILITER(S): at 14:02

## 2024-08-03 RX ADMIN — Medication 600 MILLIGRAM(S): at 20:32

## 2024-08-03 RX ADMIN — Medication 975 MILLIGRAM(S): at 00:14

## 2024-08-03 RX ADMIN — Medication 600 MILLIGRAM(S): at 15:30

## 2024-08-03 RX ADMIN — Medication 3 MILLILITER(S): at 06:15

## 2024-08-03 NOTE — PROGRESS NOTE ADULT - SUBJECTIVE AND OBJECTIVE BOX
OB Progress Note:  PPD#3    S: 30yo  PPD#3 s/p , initially admitted for PEC then met criteria for sPEC intrapartum. Patient feels well. Pain is well controlled. She is tolerating a regular diet and passing flatus. She is voiding spontaneously, and ambulating without difficulty. Denies CP/SOB. Denies lightheadedness/dizziness. Denies N/V.    O:  Vital Signs Last 24 Hrs  T(C): 36.7 (03 Aug 2024 01:05), Max: 37.1 (02 Aug 2024 05:41)  T(F): 98.1 (03 Aug 2024 01:05), Max: 98.8 (02 Aug 2024 05:41)  HR: 94 (03 Aug 2024 01:05) (94 - 115)  BP: 143/88 (03 Aug 2024 01:05) (126/84 - 147/95)  RR: 18 (03 Aug 2024 01:05) (18 - 18)  SpO2: 99% (03 Aug 2024 01:05) (95% - 99%)    Parameters below as of 03 Aug 2024 01:05  Patient On (Oxygen Delivery Method): room air    MEDICATIONS  (STANDING):  acetaminophen     Tablet .. 975 milliGRAM(s) Oral <User Schedule>  diphtheria/tetanus/pertussis (acellular) Vaccine (Adacel) 0.5 milliLiter(s) IntraMuscular once  FLUoxetine 40 milliGRAM(s) Oral daily  ibuprofen  Tablet. 600 milliGRAM(s) Oral every 6 hours  ketorolac   Injectable 30 milliGRAM(s) IV Push once  lactated ringers. 1000 milliLiter(s) (50 mL/Hr) IV Continuous <Continuous>  magnesium sulfate Infusion 2 Gm/Hr (50 mL/Hr) IV Continuous <Continuous>  NIFEdipine XL 30 milliGRAM(s) Oral daily  oxytocin Infusion 41.667 milliUNIT(s)/Min (125 mL/Hr) IV Continuous <Continuous>  prenatal multivitamin 1 Tablet(s) Oral daily  sodium chloride 0.9% lock flush 3 milliLiter(s) IV Push every 8 hours    Labs:  Blood type: A Positive  Rubella IgG: RPR: Negative                          9.5<L>   12.02<H> >-----------< 336    (  @ 06:24 )             30.8<L>                        8.5<L>   9.85 >-----------< 309    (  @ 00:39 )             26.4<L>    24 @ 03:45      132<L>  |  99  |  4<L>  ----------------------------<  136<H>  4.4   |  20<L>  |  0.73    24 @ 06:24      136  |  102  |  6<L>  ----------------------------<  86  3.8   |  18<L>  |  0.59    24 @ 00:39      134<L>  |  103  |  8   ----------------------------<  70  4.1   |  20<L>  |  0.61        Ca    7.1<L>      01 Aug 2024 03:45  Ca    8.5      2024 06:24  Ca    8.7      2024 00:39  Mg     6.7<H>       Mg     7.4<H>       Mg     6.6<H>       Mg     5.4<H>         TPro  5.0<L>  /  Alb  2.8<L>  /  TBili  0.1<L>  /  DBili  x   /  AST  21  /  ALT  6<L>  /  AlkPhos  186<H>  24 @ 03:45  TPro  6.6  /  Alb  3.4  /  TBili  0.1<L>  /  DBili  x   /  AST  14  /  ALT  7<L>  /  AlkPhos  285<H>  24 @ 06:24  TPro  6.0  /  Alb  3.3  /  TBili  0.1<L>  /  DBili  x   /  AST  12  /  ALT  7<L>  /  AlkPhos  252<H>  24 @ 00:39    Physical Exam:  General: NAD  Abdomen: soft, non-tender, non-distended, fundus firm  Vaginal: Lochia wnl  Extremities: No erythema/edema

## 2024-08-03 NOTE — DISCHARGE NOTE OB - CARE PROVIDER_API CALL
Bertha Lla  Obstetrics and Gynecology  [address] [facility], [address] [place] [id number]  Phone: [phone]  Fax: [phone]  Established Patient  Follow Up Time: 1-3 days

## 2024-08-03 NOTE — DISCHARGE NOTE OB - CARE PLAN
1 Principal Discharge DX:	Vaginal delivery  Assessment and plan of treatment:	self ambulation, pain control  Secondary Diagnosis:	Preeclampsia, unspecified trimester  Assessment and plan of treatment:	Blood pressure check twice a day, Continue blood pressure med as prescribed

## 2024-08-03 NOTE — DISCHARGE NOTE OB - MEDICATION SUMMARY - MEDICATIONS TO STOP TAKING
I will STOP taking the medications listed below when I get home from the hospital:    cyclobenzaprine 5 mg oral tablet  -- 1 tab(s) by mouth 3 times a day   -- Some non-prescription drugs may aggravate your condition.  Read all labels carefully.  If a warning appears, check with your doctor before taking.  This drug may impair the ability to drive or operate machinery.  Use care until you become familiar with its effects.

## 2024-08-03 NOTE — DISCHARGE NOTE OB - INSTRUCTIONS
Please follow up with MD as instructed.  Check blood pressure twice a day. Call MD if upper number is greater than 150 and lower number is greater than 100.  Call MD if experiencing increased vaginal bleeding, headache, blurry vision, dizziness, lightheadedness or any other questions or concerns.

## 2024-08-03 NOTE — DISCHARGE NOTE OB - PATIENT PORTAL LINK FT
You can access the FollowMyHealth Patient Portal offered by [facility] by registering at the following website: [email or web address]. By joining [facility]’s FollowMyHealth portal, you will also be able to view your health information using other applications (apps) compatible with our system.

## 2024-08-03 NOTE — DISCHARGE NOTE OB - HOSPITAL COURSE
labor  anemic upon admission  vaginal delivery  significant anemia noted postpartum, likely secondary to acute blood loss during delivery with anemia present at time of delivery  no ongoing blood loss noted  2u pRBC transfusion given  Appropriate increase in blood count with no suspicion for ongoing blood loss   Due to preeclampsia, Mag also given 24hours postpartum and gradual titration of antihypertensive meds for blood pressure management  dc home once BP mgmt ok  fu in office in 2-3days for BP check   Jessica Ramos MD

## 2024-08-03 NOTE — DISCHARGE NOTE OB - PLAN OF CARE
Blood pressure check twice a day, Continue blood pressure med as prescribed self ambulation, pain control

## 2024-08-03 NOTE — DISCHARGE NOTE OB - SWOLLEN, PAINFUL HOT AREAS AND/OR STREAKS ON THE BREAST
ADMISSION DATE:  11/29/2019  DISCHARGE DATE:  12/02/2019  ADMISSION DATE:  11/29/2019.  DISCHARGE DATE:  12/02/2019.  ADMISSION DIAGNOSES:    1. Acute CVA.  2. Hypertension.  3. Shortness of breath.  4. Hyperlipidemia.  5. Anxiety.  6. Depression.  7. Disorientation.  DISCHARGE DIAGNOSES:    1. Aphasia.  2. Acute stroke.  3. Dysphagia.  4. Confusion.  5. Weight loss.  6. Asthma.   7. Hypertension.  8. Anxiety.  9. Depression.  10. Hypercholesterolemia.  11. Anemia.  12. Constipation.  13. Hyponatremia present on admission.  PHYSICAL EXAMINATION:  On 12/02/2019, the patient is overall feeling much  better.  Does not have any specific complaints.  He is eating breakfast and his  appetite seems to be improved.  He denies any chest pain, shortness of breath.  He denies any problems with swallowing.  HEENT:  Pupils equal, react to light  and accommodation.  Extraocular muscles intact.  Mucous membranes moist.  Lungs:  Clear to auscultation.  Heart:  Rhythm is regular.  Abdomen:  Soft,  nontender.  Extremities:  No edema.  Neurologic:  Nonfocal.   HOSPITAL COURSE BY PROBLEM:    1. Aphasia.  The patient admitted with aphasia.  Symptoms resolved after tPA.  2. Acute stroke likely cerebellar, given tPA.  The patient currently is  maintained on aspirin 325 mg as per Neurology.   3. Dysphagia being evaluated by Speech Therapy.  He __________ is going to have  evaluation prior to discharge.  We will follow their recommendations.  4. Confusion.  The patient had multiple episodes of confusion in the last  several weeks prior to admission.  He appears to be better, but is going to be  observed closely.  He is going to have 24 hour supervision for now.  5. Weight loss.  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  Appears to be eating a little better, but this is going to be  observed closely.   6. Asthma appears stable.  7. Hypertension.  Blood pressure medications adjusted, running well.  8. Anxiety and  depression.  The patient seems to be doing quite well at present.  9. Hypercholesterolemia.  Simvastatin was stopped before due to weight loss,  but now the patient was restarted on atorvastatin.   10. Pansinusitis on the MRI.  The patient does not have any fever.  Does not  have any sinusitis symptoms.  We will not treat, but we will monitor.  11. Anemia, mild stable.  12. Constipation.  The patient is doing well with stool softener and senna.  13. Hyponatremia, present on admission, resolved.  14. Driving.  The patient was advised to not drive for now, is going to be  re-evaluated in the future.   The patient is going to be discharged home with 24 hour supervision.  He most  likely is going to have an event monitor applied prior to discharge.  He was  told to see me in 3 days in the office.  He is going to follow up with  Neurology as recommended.  Discussed with niece and she is going to call with  any concerns.   DATE AND TIME BERNARD Villa/MEDQ-#[id number]  DD:  12/02/2019 09:07:58      DT:  12/02/2019 17:05:30  [facility]      [facility]                      MRN#: [id number]  ACCT#: [id number]  DISCHARGE SUMMARY   Statement Selected

## 2024-08-03 NOTE — PROGRESS NOTE ADULT - ASSESSMENT
A/P: 28yo PPD#3 s/p , initially admitted for PEC then met criteria for sPEC intrapartum. Patient is stable and doing well post-partum.     #sPEC (BP)  - Now s/p Mg for seizure ppx  - Continue [id number] daily  - Overnight BPs 130s-140s/80s-90s  - AM HELLP labs     #PP state  - Pain well controlled, continue current pain regimen  - Increase ambulation, SCDs when not ambulating  - Continue regular diet    Cat Carlin, PGY3 A/P: 28yo PPD#3 s/p , initially admitted for PEC then met criteria for sPEC intrapartum. Patient is stable and doing well post-partum.     #sPEC (BP)  - Now s/p Mg for seizure ppx  - Continue [id number] daily  - Overnight BPs 130s-140s/80s-90s  - AM HELLP labs     #Acute blood loss anemia  - H/H: 9.5/30.8->4.8/15.4->4.8/14.9->2u PRBCs->8.8/26.9  - F/u AM labs    #PP state  - Pain well controlled, continue current pain regimen  - Increase ambulation, SCDs when not ambulating  - Continue regular diet    Cat Carlin, PGY3 A/P: 30yo PPD#3 s/p , initially admitted for PEC then met criteria for sPEC intrapartum. Patient is stable and doing well post-partum.     #sPEC (BP)  - Now s/p Mg for seizure ppx  - Overnight BPs 130s-150s/80s-100s  - Uptitrate to [id number] daily this AM  - AM HELLP labs     #Acute blood loss anemia  - H/H: 9.5/30.8->4.8/15.4->4.8/14.9->2u PRBCs->8.8/26.9  - F/u AM labs    #PP state  - Pain well controlled, continue current pain regimen  - Increase ambulation, SCDs when not ambulating  - Continue regular diet    Uptitration d/w attending, Dr. Rachel Carlin, PGY3

## 2024-08-03 NOTE — DISCHARGE NOTE OB - MEDICATION SUMMARY - MEDICATIONS TO TAKE
I will START or STAY ON the medications listed below when I get home from the hospital:    ibuprofen 600 mg oral tablet  -- 1 tab(s) by mouth every 6 hours  -- Indication: For Vaginal delivery    acetaminophen 325 mg oral tablet  -- 3 tab(s) by mouth every 6 hours  -- Indication: For Vaginal delivery    FLUoxetine 40 mg oral capsule  -- 1 cap(s) by mouth once a day  -- Indication: For Anxious mood    NIFEdipine 60 mg oral tablet, extended release  -- 1 tab(s) by mouth once a day  -- Indication: For Mild pre-eclampsia in third trimester   I will START or STAY ON the medications listed below when I get home from the hospital:    ibuprofen 600 mg oral tablet  -- 1 tab(s) by mouth every 6 hours  -- Indication: For Vaginal delivery    acetaminophen 325 mg oral tablet  -- 3 tab(s) by mouth every 6 hours  -- Indication: For Vaginal delivery    FLUoxetine 40 mg oral capsule  -- 1 cap(s) by mouth once a day  -- Indication: For Anxious mood    labetalol 300 mg oral tablet  -- 1 tab(s) by mouth 3 times a day  -- Indication: For BP meds    NIFEdipine 60 mg oral tablet, extended release  -- 1 tab(s) by mouth once a day  -- Indication: For Mild pre-eclampsia in third trimester

## 2024-08-03 NOTE — PROGRESS NOTE ADULT - NS ATTEND AMEND GEN_ALL_CORE FT
Doing well. Felling well.   s/p 2u pRBC.   VSS Afebrile  BP increasing with SBP 150s this am x2.   ppd3 doing well  will increase BP meds to procardia xl 60mg po daily  routine postpartum care  desires dc today  informed pt that it will depend on BP mgmt  poss dc later today pending BP  Jessica Ramos MD

## 2024-08-04 RX ORDER — LABETALOL HCL 200 MG
200 TABLET ORAL EVERY 8 HOURS
Refills: 0 | Status: DISCONTINUED | OUTPATIENT
Start: 2024-08-04 | End: 2024-08-05

## 2024-08-04 RX ADMIN — Medication 1 TABLET(S): at 13:17

## 2024-08-04 RX ADMIN — Medication 200 MILLIGRAM(S): at 13:57

## 2024-08-04 RX ADMIN — Medication 975 MILLIGRAM(S): at 06:00

## 2024-08-04 RX ADMIN — Medication 600 MILLIGRAM(S): at 15:59

## 2024-08-04 RX ADMIN — Medication 975 MILLIGRAM(S): at 06:46

## 2024-08-04 RX ADMIN — Medication 600 MILLIGRAM(S): at 21:35

## 2024-08-04 RX ADMIN — NIFEDIPINE 60 MILLIGRAM(S): 20 CAPSULE, LIQUID FILLED ORAL at 06:00

## 2024-08-04 RX ADMIN — Medication 40 MILLIGRAM(S): at 13:17

## 2024-08-04 RX ADMIN — Medication 975 MILLIGRAM(S): at 13:18

## 2024-08-04 RX ADMIN — Medication 975 MILLIGRAM(S): at 00:00

## 2024-08-04 RX ADMIN — Medication 200 MILLIGRAM(S): at 21:35

## 2024-08-04 RX ADMIN — Medication 975 MILLIGRAM(S): at 13:56

## 2024-08-04 NOTE — PROGRESS NOTE ADULT - NS ATTEND AMEND GEN_ALL_CORE FT
doing well. feeling ok.   pain ok with po pain meds  -110, /100s  ppd4  routine postpartum care  will add labetalol 200mg po tid to current regimen of procardia xl 60mg po daily  [facility] md doing well. feeling ok.   pain ok with po pain meds  -110, /90-100s  ppd4  routine postpartum care  will add labetalol 200mg po tid to current regimen of procardia xl 60mg po daily  pt states her HR is normally in 100-110s  no concern with further acute blood loss   will hold CBC for now  mer[facility] joy md

## 2024-08-04 NOTE — PROGRESS NOTE ADULT - ASSESSMENT
A/P: 28yo PPD#4 s/p , initially admitted for PEC then met criteria for sPEC intrapartum. Patient is stable and doing well post-partum.     #sPEC (BP)  - Now s/p Mg for seizure ppx  - Overnight BPs 130s-150s/80s-100s  - Uptitrate to [id number] daily this AM  - HELLP wnl (8/3)    #Acute blood loss anemia  - H/H: 9.5/30.8->4.8/15.4->4.8/14.9->2u PRBCs->8.8/26.9->8.4/25.3      #PP state  - Pain well controlled, continue current pain regimen  - Increase ambulation, SCDs when not ambulating  - Continue regular diet    Alfred Clark, PGY3

## 2024-08-04 NOTE — PROGRESS NOTE ADULT - SUBJECTIVE AND OBJECTIVE BOX
OB Progress Note:  PPD#4    S: Patient seen at bedside. Patient feels well. Pain is well controlled, tolerating regular diet, passing flatus, voiding spontaneously, ambulating without difficulty. Denies heavy vaginal bleeding, CP/SOB, lightheadedness/dizziness, N/V.    O:  Vitals:   Vital Signs Last 24 Hrs  T(C): 36.6 (04 Aug 2024 01:10), Max: 36.7 (03 Aug 2024 09:05)  T(F): 97.9 (04 Aug 2024 01:10), Max: 98.1 (03 Aug 2024 13:50)  HR: 110 (04 Aug 2024 01:10) (101 - 110)  BP: 131/91 (04 Aug 2024 01:10) (128/85 - 150/86)  BP(mean): --  RR: 18 (04 Aug 2024 01:10) (16 - 18)  SpO2: 99% (04 Aug 2024 01:10) (98% - 100%)    Parameters below as of 04 Aug 2024 01:10  Patient On (Oxygen Delivery Method): room air        MEDICATIONS  (STANDING):  acetaminophen     Tablet .. 975 milliGRAM(s) Oral <User Schedule>  diphtheria/tetanus/pertussis (acellular) Vaccine (Adacel) 0.5 milliLiter(s) IntraMuscular once  FLUoxetine 40 milliGRAM(s) Oral daily  ibuprofen  Tablet. 600 milliGRAM(s) Oral every 6 hours  ketorolac   Injectable 30 milliGRAM(s) IV Push once  lactated ringers. 1000 milliLiter(s) (50 mL/Hr) IV Continuous <Continuous>  NIFEdipine XL 60 milliGRAM(s) Oral daily  oxytocin Infusion 41.667 milliUNIT(s)/Min (125 mL/Hr) IV Continuous <Continuous>  prenatal multivitamin 1 Tablet(s) Oral daily  sodium chloride 0.9% lock flush 3 milliLiter(s) IV Push every 8 hours    MEDICATIONS  (PRN):  benzocaine 20%/menthol 0.5% Spray 1 Spray(s) Topical every 6 hours PRN for Perineal discomfort  dibucaine 1% Ointment 1 Application(s) Topical every 6 hours PRN Perineal discomfort  diphenhydrAMINE 25 milliGRAM(s) Oral every 6 hours PRN Pruritus  hydrocortisone 1% Cream 1 Application(s) Topical every 6 hours PRN Moderate Pain (4-6)  lanolin Ointment 1 Application(s) Topical every 6 hours PRN nipple soreness  magnesium hydroxide Suspension 30 milliLiter(s) Oral two times a day PRN Constipation  oxyCODONE    IR 5 milliGRAM(s) Oral once PRN Moderate to Severe Pain (4-10)  oxyCODONE    IR 5 milliGRAM(s) Oral every 3 hours PRN Moderate to Severe Pain (4-10)  pramoxine 1%/zinc 5% Cream 1 Application(s) Topical every 4 hours PRN Moderate Pain (4-6)  simethicone 80 milliGRAM(s) Chew every 4 hours PRN Gas  witch hazel Pads 1 Application(s) Topical every 4 hours PRN Perineal discomfort      Labs:  Blood type: A Positive  Rubella IgG: RPR: Negative                          8.4<L>   14.14<H> >-----------< 283    (  @ 07:08 )             25.3<L>                        8.8<L>   14.77<H> >-----------< 309    (  @ 19:17 )             26.9<L>                        4.8<LL>   10.76<H> >-----------< 233    (  @ 07:41 )             14.9<LL>                        4.8<LL>   10.73<H> >-----------< 240    (  @ 06:29 )             15.4<LL>    24 @ 07:11      139  |  104  |  8   ----------------------------<  68<L>  4.1   |  23  |  0.62    24 @ 06:29      141  |  108  |  11  ----------------------------<  88  4.4   |  23  |  0.79    24 @ 03:45      132<L>  |  99  |  4<L>  ----------------------------<  136<H>  4.4   |  20<L>  |  0.73        Ca    8.7      03 Aug 2024 07:11  Ca    8.4      02 Aug 2024 06:29  Ca    7.1<L>      01 Aug 2024 03:45  Mg     6.7<H>       Mg     6.7<H>         TPro  5.9<L>  /  Alb  3.0<L>  /  TBili  0.1<L>  /  DBili  x   /  AST  23  /  ALT  15  /  AlkPhos  178<H>  24 @ 07:11  TPro  4.8<L>  /  Alb  2.7<L>  /  TBili  <0.1<L>  /  DBili  x   /  AST  15  /  ALT  11  /  AlkPhos  154<H>  24 @ 06:29  TPro  5.0<L>  /  Alb  2.8<L>  /  TBili  0.1<L>  /  DBili  x   /  AST  21  /  ALT  6<L>  /  AlkPhos  186<H>  24 @ 03:45          Physical Exam:  General: NAD  Abdomen: soft, non-tender, non-distended, fundus firm  Vaginal: No heavy vaginal bleeding  Extremities: No erythema/edema OB Progress Note:  PPD#4  S: Patient seen at bedside. Patient feels well. Pain is well controlled, tolerating regular diet, passing flatus, voiding spontaneously, ambulating without difficulty. Denies heavy vaginal bleeding, lightheadedness/dizziness, N/V. Denies HA, SOB, CP, RUQ/epigastric pain, b/l swelling LE and UE, or vision changes.    O:  Vitals:   Vital Signs Last 24 Hrs  T(C): 36.6 (04 Aug 2024 01:10), Max: 36.7 (03 Aug 2024 09:05)  T(F): 97.9 (04 Aug 2024 01:10), Max: 98.1 (03 Aug 2024 13:50)  HR: 110 (04 Aug 2024 01:10) (101 - 110)  BP: 131/91 (04 Aug 2024 01:10) (128/85 - 150/86)  BP(mean): --  RR: 18 (04 Aug 2024 01:10) (16 - 18)  SpO2: 99% (04 Aug 2024 01:10) (98% - 100%)    Parameters below as of 04 Aug 2024 01:10  Patient On (Oxygen Delivery Method): room air        MEDICATIONS  (STANDING):  acetaminophen     Tablet .. 975 milliGRAM(s) Oral <User Schedule>  diphtheria/tetanus/pertussis (acellular) Vaccine (Adacel) 0.5 milliLiter(s) IntraMuscular once  FLUoxetine 40 milliGRAM(s) Oral daily  ibuprofen  Tablet. 600 milliGRAM(s) Oral every 6 hours  ketorolac   Injectable 30 milliGRAM(s) IV Push once  lactated ringers. 1000 milliLiter(s) (50 mL/Hr) IV Continuous <Continuous>  NIFEdipine XL 60 milliGRAM(s) Oral daily  oxytocin Infusion 41.667 milliUNIT(s)/Min (125 mL/Hr) IV Continuous <Continuous>  prenatal multivitamin 1 Tablet(s) Oral daily  sodium chloride 0.9% lock flush 3 milliLiter(s) IV Push every 8 hours    MEDICATIONS  (PRN):  benzocaine 20%/menthol 0.5% Spray 1 Spray(s) Topical every 6 hours PRN for Perineal discomfort  dibucaine 1% Ointment 1 Application(s) Topical every 6 hours PRN Perineal discomfort  diphenhydrAMINE 25 milliGRAM(s) Oral every 6 hours PRN Pruritus  hydrocortisone 1% Cream 1 Application(s) Topical every 6 hours PRN Moderate Pain (4-6)  lanolin Ointment 1 Application(s) Topical every 6 hours PRN nipple soreness  magnesium hydroxide Suspension 30 milliLiter(s) Oral two times a day PRN Constipation  oxyCODONE    IR 5 milliGRAM(s) Oral once PRN Moderate to Severe Pain (4-10)  oxyCODONE    IR 5 milliGRAM(s) Oral every 3 hours PRN Moderate to Severe Pain (4-10)  pramoxine 1%/zinc 5% Cream 1 Application(s) Topical every 4 hours PRN Moderate Pain (4-6)  simethicone 80 milliGRAM(s) Chew every 4 hours PRN Gas  witch hazel Pads 1 Application(s) Topical every 4 hours PRN Perineal discomfort      Labs:  Blood type: A Positive  Rubella IgG: RPR: Negative                          8.4<L>   14.14<H> >-----------< 283    (  @ 07:08 )             25.3<L>                        8.8<L>   14.77<H> >-----------< 309    (  @ 19:17 )             26.9<L>                        4.8<LL>   10.76<H> >-----------< 233    (  @ 07:41 )             14.9<LL>                        4.8<LL>   10.73<H> >-----------< 240    (  @ 06:29 )             15.4<LL>    24 @ 07:11      139  |  104  |  8   ----------------------------<  68<L>  4.1   |  23  |  0.62    24 @ 06:29      141  |  108  |  11  ----------------------------<  88  4.4   |  23  |  0.79    24 @ 03:45      132<L>  |  99  |  4<L>  ----------------------------<  136<H>  4.4   |  20<L>  |  0.73        Ca    8.7      03 Aug 2024 07:11  Ca    8.4      02 Aug 2024 06:29  Ca    7.1<L>      01 Aug 2024 03:45  Mg     6.7<H>       Mg     6.7<H>         TPro  5.9<L>  /  Alb  3.0<L>  /  TBili  0.1<L>  /  DBili  x   /  AST  23  /  ALT  15  /  AlkPhos  178<H>  24 @ 07:11  TPro  4.8<L>  /  Alb  2.7<L>  /  TBili  <0.1<L>  /  DBili  x   /  AST  15  /  ALT  11  /  AlkPhos  154<H>  24 @ 06:29  TPro  5.0<L>  /  Alb  2.8<L>  /  TBili  0.1<L>  /  DBili  x   /  AST  21  /  ALT  6<L>  /  AlkPhos  186<H>  24 @ 03:45          Physical Exam:  General: NAD  Abdomen: soft, non-tender, non-distended, fundus firm  Vaginal: No heavy vaginal bleeding  Extremities: No erythema/edema

## 2024-08-05 VITALS
DIASTOLIC BLOOD PRESSURE: 86 MMHG | RESPIRATION RATE: 18 BRPM | TEMPERATURE: 98 F | OXYGEN SATURATION: 100 % | SYSTOLIC BLOOD PRESSURE: 123 MMHG | HEART RATE: 106 BPM

## 2024-08-05 PROCEDURE — 86900 BLOOD TYPING SEROLOGIC ABO: CPT

## 2024-08-05 PROCEDURE — 81001 URINALYSIS AUTO W/SCOPE: CPT

## 2024-08-05 PROCEDURE — 59050 FETAL MONITOR W/REPORT: CPT

## 2024-08-05 PROCEDURE — 85027 COMPLETE CBC AUTOMATED: CPT

## 2024-08-05 PROCEDURE — 86850 RBC ANTIBODY SCREEN: CPT

## 2024-08-05 PROCEDURE — 36430 TRANSFUSION BLD/BLD COMPNT: CPT

## 2024-08-05 PROCEDURE — 80053 COMPREHEN METABOLIC PANEL: CPT

## 2024-08-05 PROCEDURE — 85025 COMPLETE CBC W/AUTO DIFF WBC: CPT

## 2024-08-05 PROCEDURE — 83615 LACTATE (LD) (LDH) ENZYME: CPT

## 2024-08-05 PROCEDURE — P9016: CPT

## 2024-08-05 PROCEDURE — 36415 COLL VENOUS BLD VENIPUNCTURE: CPT

## 2024-08-05 PROCEDURE — 86901 BLOOD TYPING SEROLOGIC RH(D): CPT

## 2024-08-05 PROCEDURE — 82570 ASSAY OF URINE CREATININE: CPT

## 2024-08-05 PROCEDURE — 84156 ASSAY OF PROTEIN URINE: CPT

## 2024-08-05 PROCEDURE — 86923 COMPATIBILITY TEST ELECTRIC: CPT

## 2024-08-05 PROCEDURE — 82962 GLUCOSE BLOOD TEST: CPT

## 2024-08-05 PROCEDURE — 88307 TISSUE EXAM BY PATHOLOGIST: CPT

## 2024-08-05 PROCEDURE — 84550 ASSAY OF BLOOD/URIC ACID: CPT

## 2024-08-05 PROCEDURE — 86780 TREPONEMA PALLIDUM: CPT

## 2024-08-05 PROCEDURE — 83735 ASSAY OF MAGNESIUM: CPT

## 2024-08-05 RX ORDER — LABETALOL HCL 200 MG
1 TABLET ORAL
Qty: 90 | Refills: 0
Start: 2024-08-05 | End: 2024-09-03

## 2024-08-05 RX ORDER — NIFEDIPINE 20 MG/1
1 CAPSULE, LIQUID FILLED ORAL
Qty: 20 | Refills: 0
Start: 2024-08-05

## 2024-08-05 RX ORDER — LABETALOL HCL 200 MG
1 TABLET ORAL
Qty: 0 | Refills: 0 | DISCHARGE

## 2024-08-05 RX ORDER — LABETALOL HCL 200 MG
300 TABLET ORAL THREE TIMES A DAY
Refills: 0 | Status: DISCONTINUED | OUTPATIENT
Start: 2024-08-05 | End: 2024-08-05

## 2024-08-05 RX ADMIN — NIFEDIPINE 60 MILLIGRAM(S): 20 CAPSULE, LIQUID FILLED ORAL at 05:52

## 2024-08-05 RX ADMIN — Medication 1 TABLET(S): at 12:01

## 2024-08-05 RX ADMIN — Medication 600 MILLIGRAM(S): at 10:10

## 2024-08-05 RX ADMIN — Medication 975 MILLIGRAM(S): at 05:52

## 2024-08-05 RX ADMIN — Medication 975 MILLIGRAM(S): at 12:40

## 2024-08-05 RX ADMIN — Medication 300 MILLIGRAM(S): at 14:04

## 2024-08-05 RX ADMIN — Medication 200 MILLIGRAM(S): at 05:52

## 2024-08-05 RX ADMIN — Medication 975 MILLIGRAM(S): at 12:00

## 2024-08-05 RX ADMIN — Medication 40 MILLIGRAM(S): at 12:00

## 2024-08-05 RX ADMIN — Medication 975 MILLIGRAM(S): at 00:00

## 2024-08-05 RX ADMIN — Medication 600 MILLIGRAM(S): at 09:27

## 2024-08-05 NOTE — PROGRESS NOTE ADULT - SUBJECTIVE AND OBJECTIVE BOX
OB Progress Note:  PPD#5    S: Patient feels well. Pain is well controlled. She is tolerating a regular diet. She is voiding spontaneously and ambulating without difficulty. Endorses light vaginal bleeding, soaking < 1 pad/hour. Denies CP/SOB, lightheadedness/dizziness, N/V. Denies headache, visual changes, RUQ pain, worsening edema.    MEDICATIONS  (STANDING):  acetaminophen     Tablet .. 975 milliGRAM(s) Oral <User Schedule>  diphtheria/tetanus/pertussis (acellular) Vaccine (Adacel) 0.5 milliLiter(s) IntraMuscular once  FLUoxetine 40 milliGRAM(s) Oral daily  ibuprofen  Tablet. 600 milliGRAM(s) Oral every 6 hours  ketorolac   Injectable 30 milliGRAM(s) IV Push once  labetalol 200 milliGRAM(s) Oral every 8 hours  lactated ringers. 1000 milliLiter(s) (50 mL/Hr) IV Continuous <Continuous>  NIFEdipine XL 60 milliGRAM(s) Oral daily  oxytocin Infusion 41.667 milliUNIT(s)/Min (125 mL/Hr) IV Continuous <Continuous>  prenatal multivitamin 1 Tablet(s) Oral daily  sodium chloride 0.9% lock flush 3 milliLiter(s) IV Push every 8 hours      MEDICATIONS  (PRN):  benzocaine 20%/menthol 0.5% Spray 1 Spray(s) Topical every 6 hours PRN for Perineal discomfort  dibucaine 1% Ointment 1 Application(s) Topical every 6 hours PRN Perineal discomfort  diphenhydrAMINE 25 milliGRAM(s) Oral every 6 hours PRN Pruritus  hydrocortisone 1% Cream 1 Application(s) Topical every 6 hours PRN Moderate Pain (4-6)  lanolin Ointment 1 Application(s) Topical every 6 hours PRN nipple soreness  magnesium hydroxide Suspension 30 milliLiter(s) Oral two times a day PRN Constipation  oxyCODONE    IR 5 milliGRAM(s) Oral every 3 hours PRN Moderate to Severe Pain (4-10)  oxyCODONE    IR 5 milliGRAM(s) Oral once PRN Moderate to Severe Pain (4-10)  pramoxine 1%/zinc 5% Cream 1 Application(s) Topical every 4 hours PRN Moderate Pain (4-6)  simethicone 80 milliGRAM(s) Chew every 4 hours PRN Gas  witch hazel Pads 1 Application(s) Topical every 4 hours PRN Perineal discomfort      O:  Vitals:   Vital Signs Last 24 Hrs  T(C): 36.9 (05 Aug 2024 05:23), Max: 37 (04 Aug 2024 16:30)  T(F): 98.4 (05 Aug 2024 05:23), Max: 98.6 (04 Aug 2024 16:30)  HR: 102 (05 Aug 2024 05:) (102 - 116)  BP: 136/93 (05 Aug 2024 05:) (117/77 - 145/102)  BP(mean): --  RR: 18 (05 Aug 2024 05:) (18 - 18)  SpO2: 99% (05 Aug 2024 05:) (96% - 99%)    Parameters below as of 05 Aug 2024 05:23  Patient On (Oxygen Delivery Method): room air        Labs:  Blood type: A Positive  Rubella IgG: RPR: Negative                          8.4<L>   14.14<H> >-----------< 283    (  @ 07:08 )             25.3<L>                        8.8<L>   14.77<H> >-----------< 309    (  @ 19:17 )             26.9<L>                        4.8<LL>   10.76<H> >-----------< 233    (  @ 07:41 )             14.9<LL>                        4.8<LL>   10.73<H> >-----------< 240    (  @ 06:29 )             15.4<LL>    24 @ 07:11      139  |  104  |  8   ----------------------------<  68<L>  4.1   |  23  |  0.62    24 @ 06:29      141  |  108  |  11  ----------------------------<  88  4.4   |  23  |  0.79        Ca    8.7      03 Aug 2024 07:11  Ca    8.4      02 Aug 2024 06:29    TPro  5.9<L>  /  Alb  3.0<L>  /  TBili  0.1<L>  /  DBili  x   /  AST  23  /  ALT  15  /  AlkPhos  178<H>  24 @ 07:11  TPro  4.8<L>  /  Alb  2.7<L>  /  TBili  <0.1<L>  /  DBili  x   /  AST  15  /  ALT  11  /  AlkPhos  154<H>  24 @ 06:29          Physical Exam:  General: NAD  Heart: Clinically well-perfused  Lungs: Breathing comfortably on room air

## 2024-08-05 NOTE — PROGRESS NOTE ADULT - ASSESSMENT
A/P: 30yo PPD#5 s/p , initially admitted for PEC then met criteria for sPEC intrapartum. Patient is stable and doing well post-partum.     #sPEC (BP)  - Now s/p Mg for seizure ppx  - Overnight BPs 130s/80-90s  - Uptitrated to [id number] QD plus Labetalol 200 TID  - HELLP wnl (8/3)    #Acute blood loss anemia  - H/H: 9.5/30.8->4.8/15.4->4.8/14.9->2u PRBCs->8.8/26.9->8.4/25.3    #PP state  - Pain well controlled, continue current pain regimen  - Increase ambulation, SCDs when not ambulating  - Continue regular diet    Lynne Chen PGY3

## 2024-08-08 LAB — SURGICAL PATHOLOGY STUDY: SIGNIFICANT CHANGE UP
